# Patient Record
Sex: FEMALE | Race: WHITE | NOT HISPANIC OR LATINO | Employment: OTHER | URBAN - METROPOLITAN AREA
[De-identification: names, ages, dates, MRNs, and addresses within clinical notes are randomized per-mention and may not be internally consistent; named-entity substitution may affect disease eponyms.]

---

## 2017-01-06 ENCOUNTER — TELEPHONE (OUTPATIENT)
Dept: INTERNAL MEDICINE | Facility: CLINIC | Age: 47
End: 2017-01-06

## 2017-01-06 NOTE — TELEPHONE ENCOUNTER
----- Message from Natalia García sent at 1/5/2017 11:42 AM EST -----  Contact: [phone]  PT CALLED TO GET PAP RESULTS

## 2017-01-06 NOTE — TELEPHONE ENCOUNTER
----- Message from WENDY Ignacio sent at 1/6/2017  8:33 AM EST -----  Let patient know Pap was normal repeat in 3 years

## 2017-01-09 ENCOUNTER — APPOINTMENT (OUTPATIENT)
Dept: MAMMOGRAPHY | Facility: HOSPITAL | Age: 47
End: 2017-01-09

## 2017-02-06 ENCOUNTER — HOSPITAL ENCOUNTER (OUTPATIENT)
Dept: MAMMOGRAPHY | Facility: HOSPITAL | Age: 47
Discharge: HOME | End: 2017-02-06

## 2017-02-28 ENCOUNTER — TRANSCRIBE ORDERS (OUTPATIENT)
Dept: INTERNAL MEDICINE | Facility: CLINIC | Age: 47
End: 2017-02-28

## 2017-02-28 ENCOUNTER — HOSPITAL ENCOUNTER (OUTPATIENT)
Dept: MAMMOGRAPHY | Facility: HOSPITAL | Age: 47
Discharge: HOME | End: 2017-02-28
Attending: INTERNAL MEDICINE | Admitting: INTERNAL MEDICINE

## 2017-02-28 DIAGNOSIS — R92.8 ABNORMAL MAMMOGRAM: Primary | ICD-10-CM

## 2017-02-28 DIAGNOSIS — R92.8 ABNORMAL MAMMOGRAM: ICD-10-CM

## 2017-02-28 PROCEDURE — G0206 DX MAMMO INCL CAD UNI: HCPCS

## 2017-02-28 PROCEDURE — 77065 DX MAMMO INCL CAD UNI: CPT | Performed by: RADIOLOGY

## 2017-08-29 ENCOUNTER — APPOINTMENT (OUTPATIENT)
Dept: MAMMOGRAPHY | Facility: HOSPITAL | Age: 47
End: 2017-08-29
Attending: INTERNAL MEDICINE

## 2017-09-14 ENCOUNTER — OFFICE VISIT (OUTPATIENT)
Dept: FAMILY MEDICINE CLINIC | Facility: CLINIC | Age: 47
End: 2017-09-14

## 2017-09-14 VITALS
WEIGHT: 126 LBS | OXYGEN SATURATION: 98 % | HEART RATE: 71 BPM | HEIGHT: 65 IN | SYSTOLIC BLOOD PRESSURE: 118 MMHG | DIASTOLIC BLOOD PRESSURE: 80 MMHG | BODY MASS INDEX: 20.99 KG/M2

## 2017-09-14 DIAGNOSIS — M25.521 RIGHT ELBOW PAIN: Primary | ICD-10-CM

## 2017-09-14 DIAGNOSIS — N95.1 PERIMENOPAUSAL: ICD-10-CM

## 2017-09-14 DIAGNOSIS — M05.721 RHEUMATOID ARTHRITIS INVOLVING RIGHT ELBOW WITH POSITIVE RHEUMATOID FACTOR (HCC): ICD-10-CM

## 2017-09-14 DIAGNOSIS — J45.20 MILD INTERMITTENT ASTHMA WITHOUT COMPLICATION: ICD-10-CM

## 2017-09-14 PROBLEM — M05.9 RHEUMATOID ARTHRITIS WITH POSITIVE RHEUMATOID FACTOR (HCC): Status: ACTIVE | Noted: 2017-09-14

## 2017-09-14 LAB
ALBUMIN SERPL-MCNC: 4.3 G/DL (ref 3.2–4.8)
ALBUMIN/GLOB SERPL: 1.4 G/DL (ref 1.5–2.5)
ALP SERPL-CCNC: 77 U/L (ref 25–100)
ALT SERPL W P-5'-P-CCNC: 32 U/L (ref 7–40)
ANION GAP SERPL CALCULATED.3IONS-SCNC: 10 MMOL/L (ref 3–11)
AST SERPL-CCNC: 34 U/L (ref 0–33)
BASOPHILS # BLD AUTO: 0.02 10*3/MM3 (ref 0–0.2)
BASOPHILS NFR BLD AUTO: 0.4 % (ref 0–1)
BILIRUB SERPL-MCNC: 0.6 MG/DL (ref 0.3–1.2)
BUN BLD-MCNC: 13 MG/DL (ref 9–23)
BUN/CREAT SERPL: 18.6 (ref 7–25)
CALCIUM SPEC-SCNC: 8.9 MG/DL (ref 8.7–10.4)
CHLORIDE SERPL-SCNC: 107 MMOL/L (ref 99–109)
CO2 SERPL-SCNC: 25 MMOL/L (ref 20–31)
CREAT BLD-MCNC: 0.7 MG/DL (ref 0.6–1.3)
CRP SERPL-MCNC: <0.01 MG/DL (ref 0–1)
DEPRECATED RDW RBC AUTO: 39.4 FL (ref 37–54)
EOSINOPHIL # BLD AUTO: 0.17 10*3/MM3 (ref 0–0.3)
EOSINOPHIL NFR BLD AUTO: 3.1 % (ref 0–3)
ERYTHROCYTE [DISTWIDTH] IN BLOOD BY AUTOMATED COUNT: 12.4 % (ref 11.3–14.5)
ERYTHROCYTE [SEDIMENTATION RATE] IN BLOOD: 7 MM/HR (ref 0–20)
GFR SERPL CREATININE-BSD FRML MDRD: 89 ML/MIN/1.73
GLOBULIN UR ELPH-MCNC: 3.1 GM/DL
GLUCOSE BLD-MCNC: 94 MG/DL (ref 70–100)
HCT VFR BLD AUTO: 42.4 % (ref 34.5–44)
HGB BLD-MCNC: 14.6 G/DL (ref 11.5–15.5)
IMM GRANULOCYTES # BLD: 0.01 10*3/MM3 (ref 0–0.03)
IMM GRANULOCYTES NFR BLD: 0.2 % (ref 0–0.6)
LYMPHOCYTES # BLD AUTO: 2.26 10*3/MM3 (ref 0.6–4.8)
LYMPHOCYTES NFR BLD AUTO: 41.2 % (ref 24–44)
MCH RBC QN AUTO: 30 PG (ref 27–31)
MCHC RBC AUTO-ENTMCNC: 34.4 G/DL (ref 32–36)
MCV RBC AUTO: 87.1 FL (ref 80–99)
MONOCYTES # BLD AUTO: 0.45 10*3/MM3 (ref 0–1)
MONOCYTES NFR BLD AUTO: 8.2 % (ref 0–12)
NEUTROPHILS # BLD AUTO: 2.57 10*3/MM3 (ref 1.5–8.3)
NEUTROPHILS NFR BLD AUTO: 46.9 % (ref 41–71)
PLATELET # BLD AUTO: 236 10*3/MM3 (ref 150–450)
PMV BLD AUTO: 10.3 FL (ref 6–12)
POTASSIUM BLD-SCNC: 4.5 MMOL/L (ref 3.5–5.5)
PROT SERPL-MCNC: 7.4 G/DL (ref 5.7–8.2)
RBC # BLD AUTO: 4.87 10*6/MM3 (ref 3.89–5.14)
SODIUM BLD-SCNC: 142 MMOL/L (ref 132–146)
WBC NRBC COR # BLD: 5.48 10*3/MM3 (ref 3.5–10.8)

## 2017-09-14 PROCEDURE — 80053 COMPREHEN METABOLIC PANEL: CPT | Performed by: NURSE PRACTITIONER

## 2017-09-14 PROCEDURE — 85025 COMPLETE CBC W/AUTO DIFF WBC: CPT | Performed by: NURSE PRACTITIONER

## 2017-09-14 PROCEDURE — 86140 C-REACTIVE PROTEIN: CPT | Performed by: NURSE PRACTITIONER

## 2017-09-14 PROCEDURE — 85652 RBC SED RATE AUTOMATED: CPT | Performed by: NURSE PRACTITIONER

## 2017-09-14 PROCEDURE — 36415 COLL VENOUS BLD VENIPUNCTURE: CPT | Performed by: NURSE PRACTITIONER

## 2017-09-14 PROCEDURE — 99214 OFFICE O/P EST MOD 30 MIN: CPT | Performed by: NURSE PRACTITIONER

## 2017-09-14 RX ORDER — IBUPROFEN 200 MG
200 TABLET ORAL EVERY 6 HOURS PRN
COMMUNITY

## 2017-09-14 RX ORDER — NAPROXEN 500 MG/1
500 TABLET ORAL 2 TIMES DAILY WITH MEALS
Qty: 60 TABLET | Refills: 2 | Status: SHIPPED | OUTPATIENT
Start: 2017-09-14 | End: 2017-10-27

## 2017-09-14 NOTE — PROGRESS NOTES
Subjective   Lucero Carrion is a 47 y.o. female.     History of Present Illness She needs to establish care with PCP. Complaining of right elbow pain. Has history of RA. Was on enbrel and methotrexate for 10 years and stopped it due to cost and side effects.  She is a vegetarian and gets plenty of exercise as a . She does not want to establish care with Rheumatology today. She is using Ibuprofen prn for pain. She has also tried using a wrap on her elbow which helps some.  Her mammogram and pap smear are up to date. She has not had an eye exam, but has been to the dentist this year.      The following portions of the patient's history were reviewed and updated as appropriate: allergies, current medications, past family history, past medical history, past social history, past surgical history and problem list.    Review of Systems   Constitutional: Negative for fatigue, fever and unexpected weight change.   HENT: Negative for congestion, hearing loss, nosebleeds, rhinorrhea, sore throat, trouble swallowing and voice change.    Eyes: Negative for pain, discharge, redness and visual disturbance.   Respiratory: Negative for cough, chest tightness, shortness of breath and wheezing.    Cardiovascular: Negative for chest pain, palpitations and leg swelling.   Gastrointestinal: Negative for abdominal distention, abdominal pain, anal bleeding, blood in stool, constipation, diarrhea, nausea and vomiting.   Endocrine: Negative for cold intolerance, heat intolerance, polydipsia, polyphagia and polyuria.   Genitourinary: Negative for dysuria, flank pain, frequency and hematuria.   Musculoskeletal: Positive for arthralgias. Negative for gait problem, joint swelling and myalgias.   Skin: Negative for color change and rash.   Neurological: Negative for dizziness, tremors, seizures, syncope, speech difficulty, weakness, numbness and headaches.   Hematological: Negative.    Psychiatric/Behavioral: Negative.        Objective    Physical Exam   Constitutional: She is oriented to person, place, and time. She appears well-developed and well-nourished. No distress.   HENT:   Head: Normocephalic and atraumatic.   Right Ear: Tympanic membrane and external ear normal.   Left Ear: Tympanic membrane and external ear normal.   Nose: Nose normal.   Mouth/Throat: Oropharynx is clear and moist. No oropharyngeal exudate.   Eyes: Conjunctivae are normal. Pupils are equal, round, and reactive to light. Right eye exhibits no discharge. Left eye exhibits no discharge. No scleral icterus.   Neck: Neck supple. No tracheal deviation present. No thyromegaly present.   Cardiovascular: Normal rate, regular rhythm and normal heart sounds.  Exam reveals no gallop and no friction rub.    No murmur heard.  Pulmonary/Chest: Effort normal and breath sounds normal. No respiratory distress. She has no wheezes.   Abdominal: Soft. Bowel sounds are normal. She exhibits no distension and no mass. There is no tenderness.   Musculoskeletal: She exhibits no edema or deformity.   No edema or erythema of right elbow. FROM. Tender on lateral epicondyle.   Lymphadenopathy:     She has no cervical adenopathy.   Neurological: She is alert and oriented to person, place, and time. Coordination normal.   Skin: Skin is warm and dry. No rash noted. No erythema.   Psychiatric: She has a normal mood and affect. Her speech is normal and behavior is normal. Judgment and thought content normal.   Nursing note and vitals reviewed.      Assessment/Plan   Lucero was seen today for establish care.    Diagnoses and all orders for this visit:    Right elbow pain  -     XR elbow 2 vw right; Future  -     CBC & Differential  -     Comprehensive Metabolic Panel  -     Sedimentation Rate  -     C-reactive Protein  -     naproxen (NAPROSYN) 500 MG tablet; Take 1 tablet by mouth 2 (Two) Times a Day With Meals.    Rheumatoid arthritis involving right elbow with positive rheumatoid  factor    Perimenopausal    Mild intermittent asthma without complication    Will obtain labs and x-ray today. Change ibuprofen to Naprosyn with food. Caution GI upset. Discuss referral to PT for therapy or possible dry needling. If symptoms persist and sed rate is elevated consider steroids or referral to rheumatology.   Discussed the nature of the disease including, risks, complications, implications, management, safe and proper use of medications. Encouraged therapeutic lifestyle changes including low calorie diet with plenty of fruits and vegetables, daily exercise, medication compliance, and keeping scheduled follow up appointments with me and any other providers. Encouraged patient to have appointment for complete physical, fasting labs, appropriate screenings, and immunizations on an annual basis.  Follow up symptoms persist or worsen.

## 2017-09-19 ENCOUNTER — APPOINTMENT (OUTPATIENT)
Dept: MAMMOGRAPHY | Facility: HOSPITAL | Age: 47
End: 2017-09-19
Attending: INTERNAL MEDICINE

## 2017-09-20 ENCOUNTER — HOSPITAL ENCOUNTER (OUTPATIENT)
Dept: GENERAL RADIOLOGY | Facility: HOSPITAL | Age: 47
Discharge: HOME | End: 2017-09-20

## 2017-09-20 ENCOUNTER — HOSPITAL ENCOUNTER (OUTPATIENT)
Dept: MAMMOGRAPHY | Facility: HOSPITAL | Age: 47
Discharge: HOME | End: 2017-09-20
Attending: INTERNAL MEDICINE | Admitting: INTERNAL MEDICINE

## 2017-09-20 DIAGNOSIS — R92.8 ABNORMAL MAMMOGRAM: ICD-10-CM

## 2017-09-20 DIAGNOSIS — M25.521 RIGHT ELBOW PAIN: ICD-10-CM

## 2017-09-20 PROCEDURE — 73070 X-RAY EXAM OF ELBOW: CPT

## 2017-09-20 PROCEDURE — G0206 DX MAMMO INCL CAD UNI: HCPCS

## 2017-09-20 PROCEDURE — 77065 DX MAMMO INCL CAD UNI: CPT | Performed by: RADIOLOGY

## 2017-09-26 ENCOUNTER — TELEPHONE (OUTPATIENT)
Dept: FAMILY MEDICINE CLINIC | Facility: CLINIC | Age: 47
End: 2017-09-26

## 2017-09-26 DIAGNOSIS — M25.521 ELBOW PAIN, RIGHT: Primary | ICD-10-CM

## 2017-09-26 NOTE — TELEPHONE ENCOUNTER
----- Message from Edel Banda MA sent at 9/26/2017  7:45 AM EDT -----  Contact: [phone]      ----- Message -----     From: Aminah Godinez     Sent: 9/25/2017  10:53 AM       To: Edel Banda MA    PATIENT SAYS HER ELBOW IS STILL VERY PAINFUL AND SHE NEEDS TO BE REFERRED ONTO A SPECIALIST SINCE THE XRAY WAS NORMAL

## 2017-09-27 ENCOUNTER — TELEPHONE (OUTPATIENT)
Dept: FAMILY MEDICINE CLINIC | Facility: CLINIC | Age: 47
End: 2017-09-27

## 2017-09-27 NOTE — TELEPHONE ENCOUNTER
Spoke with patient and she is requesting the compound creme regardless of cost. Will notify provider to send in.  ----- Message from Arpita Hargrove DNP, WENDY sent at 9/26/2017  3:17 PM EDT -----  Contact: [phone]  We gave her naprosyn.  There is a cream of this but it has to be compounded and insurance doesn't pay for it  ----- Message -----     From: Edel Banda MA     Sent: 9/26/2017  12:41 PM       To: Arpita Hargrove DNP, WENDY        ----- Message -----     From: Hanna Britt     Sent: 9/26/2017  12:37 PM       To: Edel Banda MA    Pt would like to know if there is an anti-inflammatory medications/creme that she can use unti her appt [facility] ortho  Pt states she did get an appt with ortho but it is a couple of weeks away    Pharm# [facility] [address]

## 2017-09-28 ENCOUNTER — TELEPHONE (OUTPATIENT)
Dept: FAMILY MEDICINE CLINIC | Facility: CLINIC | Age: 47
End: 2017-09-28

## 2017-09-28 NOTE — TELEPHONE ENCOUNTER
Patient request compounded med for elbow pain. Rx called in to [facility] for ketoprofen and lidocaine tid.  They will contact patient.

## 2017-10-04 ENCOUNTER — OFFICE VISIT (OUTPATIENT)
Dept: ORTHOPEDIC SURGERY | Facility: CLINIC | Age: 47
End: 2017-10-04

## 2017-10-04 VITALS
SYSTOLIC BLOOD PRESSURE: 156 MMHG | HEART RATE: 58 BPM | DIASTOLIC BLOOD PRESSURE: 85 MMHG | WEIGHT: 126.2 LBS | BODY MASS INDEX: 21.02 KG/M2 | HEIGHT: 65 IN

## 2017-10-04 DIAGNOSIS — M77.8 TENDINITIS OF ELBOW: Primary | ICD-10-CM

## 2017-10-04 PROCEDURE — 20551 NJX 1 TENDON ORIGIN/INSJ: CPT | Performed by: ORTHOPAEDIC SURGERY

## 2017-10-04 PROCEDURE — 99204 OFFICE O/P NEW MOD 45 MIN: CPT | Performed by: ORTHOPAEDIC SURGERY

## 2017-10-04 RX ORDER — BETAMETHASONE SODIUM PHOSPHATE AND BETAMETHASONE ACETATE 3; 3 MG/ML; MG/ML
3 INJECTION, SUSPENSION INTRA-ARTICULAR; INTRALESIONAL; INTRAMUSCULAR; SOFT TISSUE
Status: COMPLETED | OUTPATIENT
Start: 2017-10-04 | End: 2017-10-04

## 2017-10-04 RX ORDER — LIDOCAINE HYDROCHLORIDE 10 MG/ML
1 INJECTION, SOLUTION INFILTRATION; PERINEURAL
Status: COMPLETED | OUTPATIENT
Start: 2017-10-04 | End: 2017-10-04

## 2017-10-04 RX ORDER — BUPIVACAINE HYDROCHLORIDE 2.5 MG/ML
1 INJECTION, SOLUTION INFILTRATION; PERINEURAL
Status: COMPLETED | OUTPATIENT
Start: 2017-10-04 | End: 2017-10-04

## 2017-10-04 RX ADMIN — BUPIVACAINE HYDROCHLORIDE 1 ML: 2.5 INJECTION, SOLUTION INFILTRATION; PERINEURAL at 14:12

## 2017-10-04 RX ADMIN — BETAMETHASONE SODIUM PHOSPHATE AND BETAMETHASONE ACETATE 3 MG: 3; 3 INJECTION, SUSPENSION INTRA-ARTICULAR; INTRALESIONAL; INTRAMUSCULAR; SOFT TISSUE at 14:12

## 2017-10-04 RX ADMIN — LIDOCAINE HYDROCHLORIDE 1 ML: 10 INJECTION, SOLUTION INFILTRATION; PERINEURAL at 14:12

## 2017-10-04 NOTE — PROGRESS NOTES
Procedure   Lateral epicondyle elbow  Date/Time: 10/4/2017 2:12 PM  Consent given by: patient  Site marked: site marked  Timeout: Immediately prior to procedure a time out was called to verify the correct patient, procedure, equipment, support staff and site/side marked as required   Supporting Documentation  Indications: pain   Procedure Details  Location: elbow - R elbow  Preparation: Patient was prepped and draped in the usual sterile fashion  Needle size: 22 G  Approach: anteromedial  Medications administered: 1 mL bupivacaine; 1 mL lidocaine 1 %; 3 mg betamethasone acetate-betamethasone sodium phosphate 6 (3-3) MG/ML  Patient tolerance: patient tolerated the procedure well with no immediate complications

## 2017-10-27 ENCOUNTER — OFFICE VISIT (OUTPATIENT)
Dept: INTERNAL MEDICINE | Facility: CLINIC | Age: 47
End: 2017-10-27

## 2017-10-27 VITALS
WEIGHT: 123 LBS | BODY MASS INDEX: 20.47 KG/M2 | SYSTOLIC BLOOD PRESSURE: 122 MMHG | TEMPERATURE: 97.8 F | DIASTOLIC BLOOD PRESSURE: 82 MMHG | HEART RATE: 64 BPM

## 2017-10-27 DIAGNOSIS — N95.1 PERIMENOPAUSAL: Primary | ICD-10-CM

## 2017-10-27 PROCEDURE — 99213 OFFICE O/P EST LOW 20 MIN: CPT | Performed by: NURSE PRACTITIONER

## 2017-10-27 RX ORDER — FLUOXETINE HYDROCHLORIDE 20 MG/1
20 CAPSULE ORAL DAILY
Qty: 30 CAPSULE | Refills: 11 | Status: SHIPPED | OUTPATIENT
Start: 2017-10-27 | End: 2018-02-26 | Stop reason: SINTOL

## 2017-11-06 ENCOUNTER — TELEPHONE (OUTPATIENT)
Dept: INTERNAL MEDICINE | Facility: CLINIC | Age: 47
End: 2017-11-06

## 2017-11-06 DIAGNOSIS — N95.1 PERI-MENOPAUSAL: Primary | ICD-10-CM

## 2017-11-07 ENCOUNTER — TELEPHONE (OUTPATIENT)
Dept: INTERNAL MEDICINE | Facility: CLINIC | Age: 47
End: 2017-11-07

## 2017-11-22 ENCOUNTER — LAB (OUTPATIENT)
Dept: LAB | Facility: HOSPITAL | Age: 47
End: 2017-11-22

## 2017-11-22 ENCOUNTER — OFFICE VISIT (OUTPATIENT)
Dept: OBSTETRICS AND GYNECOLOGY | Facility: CLINIC | Age: 47
End: 2017-11-22

## 2017-11-22 VITALS
DIASTOLIC BLOOD PRESSURE: 90 MMHG | BODY MASS INDEX: 21.51 KG/M2 | HEART RATE: 62 BPM | WEIGHT: 126 LBS | SYSTOLIC BLOOD PRESSURE: 130 MMHG | RESPIRATION RATE: 14 BRPM | HEIGHT: 64 IN | OXYGEN SATURATION: 99 %

## 2017-11-22 DIAGNOSIS — N95.1 HOT FLASHES, MENOPAUSAL: Primary | ICD-10-CM

## 2017-11-22 DIAGNOSIS — N95.1 HOT FLASHES, MENOPAUSAL: ICD-10-CM

## 2017-11-22 DIAGNOSIS — Z79.890 HORMONE REPLACEMENT THERAPY (HRT): ICD-10-CM

## 2017-11-22 LAB
ESTRADIOL SERPL HS-MCNC: 103 PG/ML
FSH SERPL-ACNC: 73.6 MIU/ML
TSH SERPL DL<=0.05 MIU/L-ACNC: 0.97 MIU/ML (ref 0.35–5.35)

## 2017-11-22 PROCEDURE — 36415 COLL VENOUS BLD VENIPUNCTURE: CPT

## 2017-11-22 PROCEDURE — 82670 ASSAY OF TOTAL ESTRADIOL: CPT

## 2017-11-22 PROCEDURE — 99213 OFFICE O/P EST LOW 20 MIN: CPT | Performed by: NURSE PRACTITIONER

## 2017-11-22 PROCEDURE — 84443 ASSAY THYROID STIM HORMONE: CPT

## 2017-11-22 PROCEDURE — 83001 ASSAY OF GONADOTROPIN (FSH): CPT

## 2017-11-22 RX ORDER — ESTRADIOL 0.05 MG/D
1 PATCH TRANSDERMAL WEEKLY
Qty: 8 PATCH | Refills: 12 | Status: SHIPPED | OUTPATIENT
Start: 2017-11-22 | End: 2017-12-04

## 2017-11-22 RX ORDER — MEDROXYPROGESTERONE ACETATE 2.5 MG/1
2.5 TABLET ORAL DAILY
Qty: 30 TABLET | Refills: 12 | Status: SHIPPED | OUTPATIENT
Start: 2017-11-22 | End: 2017-12-04

## 2017-11-22 NOTE — PROGRESS NOTES
[facility] NEW PAT[facility]      Lucero Carrion  [id number]  1970      Chief Complaint: Establish Care (Hot flashes, night sweats, sleeplessness and mood swings)        History of present illness:  Lucero Carrion is a 47 y.o. year old female, new to the practice, who is here today for complaint of hot flashes, night sweats, sleeplessness, and mood swings.  Her nighttime symptoms have been off and on ×1.5 years that are progressively worsening.  She states the hot flashes and mood swings have been present since about 2017.  She was seen in her PCP office for this same concern on 10/27/17 and started on Prozac for vasomotor symptom management.  She states she took this less than 1 week due to feeling overly sedated and concerns regarding side effects.  She has several physician friends who encouraged her to speak with GYN regarding hormone replacement therapy.  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  She is sexually active and condoms are typically used.  Her mammogram and Pap smear currently up-to-date.  Her last routine annual well woman exam was in 2016.  Last breast abnormalities or abnormal Pap smears.  She states her periods were very regular up until 1 year ago.  She states she has had no true menstrual bleeding in 1 year, only saw spotting once or twice.  She states she feels she is becoming menopausal and is interested in starting HRT.    Obstetric History:  OB History      Para Term  AB Living    0 0 0 0 0 0    SAB TAB Ectopic Multiple Live Births    0 0 0 0 0           Menstrual History:     No LMP recorded (lmp unknown).          Past Medical History:   Diagnosis Date   • Arthritis     RHEUMATOID   • Asthma        Past Surgical History:   Procedure Laterality Date   • FINGER FRACTURE SURGERY Left        MEDICATIONS: The current medication list was reviewed and reconciled.     Allergies:  has No Known Allergies.    Family History  "  Problem Relation Age of Onset   • Osteoporosis Mother    • Asthma Father    • Breast cancer Neg Hx    • Ovarian cancer Neg Hx    • BRCA 1/2 Neg Hx        Health Maintenance:  Last mammogram was 9/20/2017. Last pap smear was 12/2016, results were  normal PAP.. She  does not have a history of abnormal pap smears.      Review of Systems   Constitutional: Negative for fatigue, fever and unexpected weight change.   HENT: Negative for congestion, ear pain, hearing loss, sinus pressure and trouble swallowing.    Eyes: Negative for visual disturbance.   Respiratory: Negative for cough, chest tightness, shortness of breath and wheezing.    Cardiovascular: Negative for chest pain, palpitations and leg swelling.   Gastrointestinal: Negative for abdominal distention, abdominal pain, constipation, diarrhea, nausea and vomiting.   Endocrine: Positive for heat intolerance (hot flashes, night sweats). Negative for cold intolerance, polydipsia, polyphagia and polyuria.   Genitourinary: Negative for difficulty urinating, dyspareunia, dysuria, frequency, hematuria, pelvic pain, urgency, vaginal bleeding, vaginal discharge and vaginal pain.   Musculoskeletal: Negative for arthralgias, gait problem, joint swelling and myalgias.   Skin: Negative for color change, pallor and rash.   Neurological: Negative for dizziness, seizures, syncope, weakness, light-headedness, numbness and headaches.   Hematological: Negative for adenopathy. Does not bruise/bleed easily.   Psychiatric/Behavioral: Positive for agitation, dysphoric mood and sleep disturbance (r/t night sweats). Negative for confusion and suicidal ideas. The patient is not nervous/anxious.        Physical Exam  Vital Signs: /90  Pulse 62  Resp 14  Ht 63.5\" (161.3 cm)  Wt 126 lb (57.2 kg)  LMP  (LMP Unknown) Comment: LMP: Nov 2016  SpO2 99%  BMI 21.97 kg/m2   General Appearance:  alert, cooperative, no apparent distress, appears stated age and normal weight "   Neurologic/Psychiatric: A&O x 3, gait steady, appropriate affect   HEENT:  Normocephalic, without obvious abnormality, mucous membranes moist   Lungs:   Clear to auscultation bilaterally; respirations regular, even, and unlabored bilaterally   Heart:  Regular rate and rhythm, no murmurs appreciated   Breasts:  deferred   Abdomen:   Soft, non-tender, non-distended and no organomegaly   Extremities: Normal, atraumatic; no clubbing, cyanosis, or edema    Skin: No rashes, ulcers, or suspicious lesions noted   Pelvic: deferred       Procedure Note:  No notes on file    Assessment and Plan:    Lucero was seen today for establish care.    Diagnoses and all orders for this visit:    Hot flashes, menopausal  -     Follicle Stimulating Hormone; Future  -     Estradiol; Future  -     TSH; Future    Hormone replacement therapy (HRT)    Other orders  -     estradiol (CLIMARA) 0.05 MG/24HR patch; Place 1 patch on the skin 1 (One) Time Per Week.  -     medroxyPROGESTERone (PROVERA) 2.5 MG tablet; Take 1 tablet by mouth Daily.        Lucero and I discussed her symptoms and amenorrhea over the last 1 year.  This is very consistent with menopausal status.  Decision was made to obtain FSH, E3, and TSH for confirmation and to rule out any thyroid abnormalities.  She will be notified of all results upon their return.  She was informed that I suspect FSH to be high, estradiol to be low, and TSH to be normal.  Any abnormalities will be treated accordingly.    She is interested in pursuing hormone replacement therapy at this time.  We discussed a variety of options.  Most importantly, as she still has a uterus she will need combined hormone replacement therapy with estrogen and a progestin.  Reviewed HRT risks, benefits, and potential side effects.  She is an active, healthy weight, nonsmoker.  Her mammogram is currently up-to-date.   At the conclusion of our discussion we agreed on beginning therapy with estradiol patches 0.05 mg and  daily Provera 2.5 mg PO.  She is understanding that she may not as her patches without taking her oral progestin.  She will be due for her annual exam around the beginning of the year.  We agreed on plan for her annual in February and can follow up on her HRT at that time.  She is encouraged to call with any questions or concerns for that time.    Return in about 3 months (around 2/22/2018) for annual exam or PRN.      Tesha Thronton, WENDY      Note: Speech recognition transcription software was used to dictate portions of this document.  An attempt at proofreading has been made though minor errors in transcription may still be present.  Please do not hesitate to call our office with any questions.

## 2017-11-27 ENCOUNTER — OFFICE VISIT (OUTPATIENT)
Dept: ORTHOPEDIC SURGERY | Facility: CLINIC | Age: 47
End: 2017-11-27

## 2017-11-27 VITALS
BODY MASS INDEX: 20.16 KG/M2 | WEIGHT: 121 LBS | DIASTOLIC BLOOD PRESSURE: 87 MMHG | HEART RATE: 67 BPM | SYSTOLIC BLOOD PRESSURE: 129 MMHG | HEIGHT: 65 IN

## 2017-11-27 DIAGNOSIS — M77.8 TENDINITIS OF ELBOW: Primary | ICD-10-CM

## 2017-11-27 PROCEDURE — 99213 OFFICE O/P EST LOW 20 MIN: CPT | Performed by: ORTHOPAEDIC SURGERY

## 2017-11-27 NOTE — PROGRESS NOTES
[facility] Note    Subjective     Chief Complaint   Patient presents with   • Right Elbow - Follow-up     3 week follow up: Right elbow tendinitis (Injection last visit on 10/4/2017)        MAUREEN Carrion is a 47 y.o. female. She got little to no relief with the injection.  Her pain is on the medial and lateral aspect of the elbow.  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  It is better with ibuprofen.  Pain is 6 out of 10 is aching burning and stabbing she's had it for over a year.        Past Medical History:   Diagnosis Date   • Arthritis     RHEUMATOID   • Asthma       Past Surgical History:   Procedure Laterality Date   • FINGER FRACTURE SURGERY Left       Family History   Problem Relation Age of Onset   • Osteoporosis Mother    • Asthma Father    • Breast cancer Neg Hx    • Ovarian cancer Neg Hx    • BRCA 1/2 Neg Hx      Social History     Social History   • Marital status:      Spouse name: N/A   • Number of children: N/A   • Years of education: N/A     Occupational History   •       Social History Main Topics   • Smoking status: Former Smoker     Packs/day: 0.50     Years: 10.00     Types: Cigarettes     Quit date: 2009   • Smokeless tobacco: Never Used   • Alcohol use Yes      Comment: 1 X WEEKLY   • Drug use: No   • Sexual activity: Defer     Other Topics Concern   • Not on file     Social History Narrative      Current Outpatient Prescriptions on File Prior to Visit   Medication Sig Dispense Refill   • albuterol (PROVENTIL HFA;VENTOLIN HFA) 108 (90 BASE) MCG/ACT inhaler Inhale 2 puffs As Needed for wheezing.     • estradiol (CLIMARA) 0.05 MG/24HR patch Place 1 patch on the skin 1 (One) Time Per Week. 8 patch 12   • FLUoxetine (PROzac) 20 MG capsule Take 1 capsule by mouth Daily. 30 capsule 11   • ibuprofen (ADVIL,MOTRIN) 200 MG tablet Take 200 mg by mouth Every 6 (Six) Hours As Needed for Mild Pain .     • magnesium chloride ER (MAG-DELAY) 535 (64  "Mg) MG CR tablet Take 64 mg by mouth Daily.     • medroxyPROGESTERone (PROVERA) 2.5 MG tablet Take 1 tablet by mouth Daily. 30 tablet 12   • Omega-3 Fatty Acids (OMEGA 3 PO) Take  by mouth.       No current facility-administered medications on file prior to visit.       No Known Allergies     The following portions of the patient's history were reviewed and updated as appropriate: allergies, current medications, past family history, past medical history, past social history, past surgical history and problem list.    Review of Systems   Constitutional: Negative.    HENT: Negative.    Eyes: Negative.    Respiratory: Positive for wheezing.    Cardiovascular: Negative.    Gastrointestinal: Negative.    Endocrine: Negative.    Genitourinary: Negative.    Musculoskeletal: Positive for arthralgias.   Skin: Negative.    Allergic/Immunologic: Negative.    Neurological: Negative.    Hematological: Negative.    Psychiatric/Behavioral: Negative.         Objective      Physical Exam  /87  Pulse 67  Ht 64.5\" (163.8 cm)  Wt 121 lb (54.9 kg)  LMP  (LMP Unknown) Comment: LMP: Nov 2016  BMI 20.45 kg/m2    Body mass index is 20.45 kg/(m^2).        GENERAL APPEARANCE: awake, alert & oriented x 3, in no acute distress and well developed, well nourished  PSYCH: normal mood andaffect  LUNGS:  breathing nonlabored, no wheezing  EYES: sclera anicteric, pupils equal  CARDIOVASCULAR: palpable pulses dorsalis pedis, palpable posterior tibial bilaterally. Capillary refill less than 2 seconds  INTEGUMENTARY: skin intact, no clubbing, cyanosis  NEUROLOGIC:  Normal gait and balance            Ortho Exam  Peripheral Vascular   Left Upper Extremity    No cyanotic nail beds    Pink nail beds and rapid capillary refill   Palpation    Radial Pulse - Bilaterally normal    Neurologic   Sensory - Elbow   Inspection and Palpation:    Light touch: intact - right hand   Muscle Strength and Tone:    Left bicep - 5/5    Left tricep - 5/5    Left " wrist extensors - 5/5     Left wrist flexors - 5/5    Left intrinsics - 5/5    Musculoskeletal   Left Upper Extremity - Elbow   Inspection and Palpation     Tenderness - medial and lateral epicondyles    Effusion - none    Crepitus - none   Range of Motion    Flexion: AROM - 145 degrees    Extension - AROM - 0 degrees     Forearm supination: AROM - 90 degrees    Forearm pronation - AROM - 90 degrees   Instability    Left - tennis elbow test negative   Deformities/Malalignments/Discepancies - non   Functional testing:    Tinel's sign negative    Valgus stress test negative    Varus stress test negative    Imaging/Studies  Imaging Results (last 24 hours)     ** No results found for the last 24 hours. **          Assessment/Plan      Lucero was seen today for follow-up.    Diagnoses and all orders for this visit:    Tendinitis of elbow  -     MRI Elbow Right Without Contrast; Future      I will see her back after the MRI    Medical Decision Making  Management Options : over-the-counter medicine  Data/Risk: radiology tests and independent visualization of imaging, lab tests, or EMG/NCV    Freddie Reyes MD  11/27/17  4:14 PM

## 2017-12-04 ENCOUNTER — HOSPITAL ENCOUNTER (OUTPATIENT)
Dept: MRI IMAGING | Facility: HOSPITAL | Age: 47
Discharge: HOME | End: 2017-12-04
Attending: ORTHOPAEDIC SURGERY | Admitting: ORTHOPAEDIC SURGERY

## 2017-12-04 ENCOUNTER — PATIENT MESSAGE (OUTPATIENT)
Dept: OBSTETRICS AND GYNECOLOGY | Facility: CLINIC | Age: 47
End: 2017-12-04

## 2017-12-04 DIAGNOSIS — M77.8 TENDINITIS OF ELBOW: ICD-10-CM

## 2017-12-04 PROCEDURE — 73221 MRI JOINT UPR EXTREM W/O DYE: CPT

## 2017-12-04 RX ORDER — ESTRADIOL 0.05 MG/D
1 FILM, EXTENDED RELEASE TRANSDERMAL 2 TIMES WEEKLY
Qty: 8 PATCH | Refills: 12 | Status: SHIPPED | OUTPATIENT
Start: 2017-12-04 | End: 2018-12-14 | Stop reason: SDUPTHER

## 2017-12-04 NOTE — TELEPHONE ENCOUNTER
From: Lucero Carrion  To: WENDY Carrizales  Sent: 12/4/2017 9:54 AM EST  Subject: Prescription Question    Imelda Parkinson,   Thank you for taking time with me and answering my myriad of questions!  I have 2 concerns about the HRT I am taking.   Firstly, I do feel better!  I would really prefer a bi-weekly patch of estradiol. 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   also   I am very concerned about taking synthetic HRT. The MPA (as you know) is synthetic progesterone.  Can you please prescribe the generic pill for Prometrium?    please let me know if these changes are possible  and thank you again!

## 2017-12-08 ENCOUNTER — TELEPHONE (OUTPATIENT)
Dept: INTERNAL MEDICINE | Facility: CLINIC | Age: 47
End: 2017-12-08

## 2017-12-12 DIAGNOSIS — Z00.00 ANNUAL PHYSICAL EXAM: Primary | ICD-10-CM

## 2017-12-13 ENCOUNTER — OFFICE VISIT (OUTPATIENT)
Dept: ORTHOPEDIC SURGERY | Facility: CLINIC | Age: 47
End: 2017-12-13

## 2017-12-13 VITALS
BODY MASS INDEX: 21.08 KG/M2 | HEIGHT: 64 IN | DIASTOLIC BLOOD PRESSURE: 66 MMHG | WEIGHT: 123.46 LBS | HEART RATE: 113 BPM | SYSTOLIC BLOOD PRESSURE: 94 MMHG

## 2017-12-13 DIAGNOSIS — M77.8 TENDINITIS OF ELBOW: Primary | ICD-10-CM

## 2017-12-13 PROCEDURE — 99213 OFFICE O/P EST LOW 20 MIN: CPT | Performed by: ORTHOPAEDIC SURGERY

## 2017-12-18 ENCOUNTER — HOSPITAL ENCOUNTER (OUTPATIENT)
Dept: PHYSICAL THERAPY | Facility: HOSPITAL | Age: 47
Setting detail: THERAPIES SERIES
Discharge: HOME | End: 2017-12-18

## 2017-12-18 DIAGNOSIS — M77.11 RIGHT LATERAL EPICONDYLITIS: Primary | ICD-10-CM

## 2017-12-18 DIAGNOSIS — G89.29 CHRONIC ELBOW PAIN, RIGHT: ICD-10-CM

## 2017-12-18 DIAGNOSIS — M25.521 CHRONIC ELBOW PAIN, RIGHT: ICD-10-CM

## 2017-12-18 PROCEDURE — 97161 PT EVAL LOW COMPLEX 20 MIN: CPT | Performed by: PHYSICAL THERAPIST

## 2017-12-18 NOTE — THERAPY EVALUATION
Outpatient Physical Therapy Ortho Initial Evaluation  [facility]     Patient Name: Lucero Carrion  : 1970  MRN: [id number]  Today's Date: 2017      Visit Date: 2017    Patient Active Problem List   Diagnosis   • Asthma   • Arthritis   • Rheumatoid arthritis with positive rheumatoid factor   • Perimenopausal        Past Medical History:   Diagnosis Date   • Arthritis     RHEUMATOID   • Asthma         Past Surgical History:   Procedure Laterality Date   • FINGER FRACTURE SURGERY Left        Visit Dx:     ICD-10-CM ICD-9-CM   1. Right lateral epicondylitis M77.11 726.32   2. Chronic elbow pain, right M25.521 719.42    G89.29 338.29             Patient History       17 1300          History    Chief Complaint Pain  -CP      Type of Pain Elbow pain  -CP      Date Current Problem(s) Began 16  -CP      Brief Description of Current Complaint Pt states ongoing right elbow pain for the past year. She states her pain increases with repetitive motion but also hurts at rest or with pressure to lateral epicondyle. She had Xray and MRI of right elbow, both negative for acute pathology per pt report. She states Dr. Reyes noted some arthritis in R elbow. Pt had injection to elbow in Oct, minimal pain relief noted afterward. She states she is limited with participation in yoga, work tasks with horses, and lifting at home/work.     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 She likes yoga, but is limited secondary to pain.   -CP      Patient seeing anyone else for  problem(s)? Yes  -CP      How has patient tried to help current problem? injection, NSAIDs, ice/heat, topical  -CP      What clinical tests have you had for this problem? X-ray;MRI  -CP      Results of Clinical Tests Xray-No acute osseous abnormality. MRI-mild arthritic change on articular surface of the capitulum as it articulates w/ radial head.   -CP      History of Previous Related Injuries --   denies previous injury; ongoing for 1 year  -CP      Are you or can you be pregnant No  -CP      Pain     Pain Location Elbow  -CP      Pain at Present 3  -CP      Pain at Best 0  -CP      Pain at Worst 8  -CP      Pain Description Aching;Dull;Sharp;Shooting;Tightness;Tingling  -CP      What Performance Factors Make the Current Problem(s) WORSE? repetitive motion into pronation, CKC positions with wt bearing through RUE, gripping ex, touch along lateral elbow  -CP      Pain Comments Pt states pain comes and goes throughout the day/night. She verbalized n/t in bilat wrists/hands, alfredo at night. Pain isolated to R elbow, both medial and lateral epicondyles.   -CP      Is your sleep disturbed? No  -CP      Is medication used to assist with sleep? No  -CP      Difficulties at work? carrying objects, lifting overhead, active pronation, etc.   -CP      Difficulties with recreational activities? pain with yoga in modified positions  -CP      Fall Risk Assessment    Any falls in the past year: No  -CP      Daily Activities    Primary Language English  -CP      Pt Participated in POC and Goals Yes  -CP      Safety    Are you being hurt, hit, or frightened by anyone at home or in your life? No  -CP        User Key  (r) = Recorded By, (t) = Taken By, (c) = Cosigned By    Initials Name Provider Type    CP Corinne E Perkins, PT Physical Therapist                PT Ortho       12/18/17 1300    Subjective Comments    Subjective Comments Pt presents with c/o right elbow pain.   -CP    Subjective Pain    Able to rate subjective pain? yes   -CP    Pre-Treatment Pain Level 3  -CP    Post-Treatment Pain Level 3  -CP    Posture/Observations    Posture/Observations Comments Increased carry angle; more pronounced R medial condyle, decreased soft tissue. No atrophy in hand noted.   -CP    Special Tests/Palpation    Special Tests/Palpation Elbow/Forearm  -CP    Elbow/Forearm Palpation    Lateral Epicondyle Right:;Tender  -CP    Medial Epicondyle Right:;Tender  -CP    Radial Head Right:;Tender   with movement; not at rest  -CP    Olecranon --   not tender  -CP    Pronator Teres Right:;Guarded/taut  -CP    Elbow/Forearm Palpation? Yes  -CP    Elbow/Forearm Special Tests    Valgus Stress at 30 Degrees (UCL Lesion) Right:;Negative  -CP    Varus Stress at 30 Degrees (RCL Lesion) Right:;Negative  -CP    Moving Valgus Stress Test,  Deg (UCL Lesion) Negative;Right:  -CP    Lateral Epicondyle Test (Tennis Elbow) Right:;Positive  -CP    Tinel's Sign (Ulnar Nerve Irritation) Right:;Negative   reports intermittent sx in distribution  -CP    Pronator Teres Syndrome Test (Median Nerve Entrapment) Right:;Negative  -CP    Active Pronation Right:;Positive  -CP    ROM (Range of Motion)    General ROM no range of motion deficits identified  -CP    General UE Assessment    ROM elbow/forearm, left: UE ROM deficit;elbow/forearm, right: UE ROM deficit  -CP    ROM Detail BUE AROM WFL, no deficits noted; decreased IR on R; however still functional.   -CP    Left Elbow/Forearm    Extension/Flexion AROM Deficit WFL; 0-145  -CP    Supination AROM Deficit WFL  -CP    Pronation AROM Deficit WFL  -CP    Right Elbow/Forearm    Extension/Flexion AROM Deficit WFL  -CP    Supination AROM Deficit WFL  -CP    Pronation AROM Deficit 75%   pain end range  -CP    MMT (Manual Muscle Testing)    General MMT Assessment Detail decreased pronation/supination strength, decrease R elbow ext 4/5.   -CP      User Key  (r) = Recorded By, (t) = Taken By, (c) = Cosigned By    Initials Name Provider Type     CP Corinne E Perkins, PT Physical Therapist                                  PT OP Goals       12/18/17 1300       PT Short Term Goals    STG Date to Achieve 01/01/18  -CP     STG 1 Patient will demonstrate independence with home exercise program.  -CP     STG 1 Progress New  -CP     STG 2 Patient will report pain 1-2 of 10 at rest on affected R side.  -CP     STG 2 Progress New  -CP     STG 3 Patient will open jar, write, prepare meal, carry/lift object up to 10 # without complaint of pain in right elbow.   -CP     STG 3 Progress New  -CP     Long Term Goals    LTG Date to Achieve 01/17/18  -CP     LTG 1 Patient will report overall Quick Dash improvements > 15 points to demonstrate increased independence with daily tasks.  -CP     LTG 1 Progress New  -CP     LTG 2 Patient will manage simulated work activities with pain less than 1/10 on R elbow.  -CP     LTG 2 Progress New  -CP     LTG 3 Pt. will be independent and compliant with advanced home exercise program to facilitate self-management of symptoms.  -CP     LTG 3 Progress New  -CP     LTG 4 Pt will return to modified home yoga techniques 3x/week without reproduction of R elbow symptoms.   -CP     LTG 4 Progress New  -CP     Time Calculation    PT Goal Re-Cert Due Date 03/18/18  -CP       User Key  (r) = Recorded By, (t) = Taken By, (c) = Cosigned By    Initials Name Provider Type    CP Corinne E Perkins, PT Physical Therapist                PT Assessment/Plan       12/18/17 1300       PT Assessment    Functional Limitations Performance in leisure activities;Performance in work activities;Limitation in home management  -CP     Impairments Sensation;Poor body mechanics;Pain;Muscle strength  -CP     Assessment Comments Pt is a 48 yo female referred to PT for right elbow tendinitis who demonstrated signs and symptoms consistent with lateral epicondylitis along with secondary arthritis. Pt has h/o RA; however denies previous elbow injuries. Pt is limited with  activity tolerance at work secondary to pain, demonstrated decreased UE strength, and verbalized decreased participation in functional hobbies, like yoga due to pain in elbow. She would benefit from skilled PT to address above stated deficits, improve quality of life, and return to PLOF.   -CP     Please refer to paper survey for additional self-reported information Yes  -CP     Rehab Potential Good  -CP     Patient/caregiver participated in establishment of treatment plan and goals Yes  -CP     Patient would benefit from skilled therapy intervention Yes  -CP     PT Plan    PT Frequency 1x/week;2x/week  -CP     Predicted Duration of Therapy Intervention (days/wks) 8-10 visits  -CP     Planned CPT's? PT EVAL LOW COMPLEXITY: 39768;PT RE-EVAL: 31641;PT THER PROC EA 15 MIN: 11808;PT MANUAL THERAPY EA 15 MIN: 35997;PT ELECTRICAL STIM UNATTEND: ;PT ULTRASOUND EA 15 MIN: 80535;PT HOT/COLD PACK WC NONMCARE: 42572;PT IONTOPHORESIS EA 15 MIN: 65323  -CP     PT Plan Comments Assess compliance with medial nerve glides and CTS stretch. Trial scapular stabilization ther ex with wrist in neutral position along with eccentric ex for pronation as tolerated. Trial DN next visit along lateral epicondyle, pending symptoms.   -CP       User Key  (r) = Recorded By, (t) = Taken By, (c) = Cosigned By    Initials Name Provider Type    CP Corinne E Perkins, PT Physical Therapist                  Exercises       12/18/17 1300          Subjective Comments    Subjective Comments Pt presents with c/o right elbow pain.   -CP      Subjective Pain    Able to rate subjective pain? yes  -CP      Pre-Treatment Pain Level 3  -CP      Post-Treatment Pain Level 3  -CP        User Key  (r) = Recorded By, (t) = Taken By, (c) = Cosigned By    Initials Name Provider Type    CP Corinne E Perkins, PT Physical Therapist                        Outcome Measure Options: Quick DASH  Quick DASH  Open a tight or new jar.: Moderate Difficulty  Do heavy household  chores (e.g., wash walls, wash floors): Moderate Difficulty  Carry a shopping bag or briefcase: Moderate Difficulty  Wash your back: No Difficulty  Use a knife to cut food: No Difficulty  Recreational activities in which you take some force or impact through your arm, should or hand (e.g. golf, hammering, tennis, etc.): Moderate Difficulty  During the past week, to what extent has your arm, shoulder, or hand problem interfered with your normal social activites with family, friends, neighbors or groups?: Quite a bit  During the past week, were you limited in your work or other regular daily activities as a result of your arm, shoulder or hand problem?: Moderately Limited  Arm, Shoulder, or hand pain: Moderate  Tingling (pins and needles) in your arm, shoulder, or hand: Severe  During the past week, how much difficulty have you had sleeping because of the pain in your arm, shoulder or hand?: Mild Difficulty  Number of Questions Answered: 11  Quick DASH Score: 43.18         Time Calculation:   Start Time: 1345     Therapy Charges for Today     Code Description Service Date Service Provider Modifiers Qty    53982100234 HC PT EVAL LOW COMPLEXITY 4 12/18/2017 Corinne E Perkins, PT GP 1          PT G-Codes  Outcome Measure Options: Quick DASH         Corinne E. Perkins, PT  12/18/2017

## 2017-12-21 ENCOUNTER — HOSPITAL ENCOUNTER (OUTPATIENT)
Dept: PHYSICAL THERAPY | Facility: HOSPITAL | Age: 47
Setting detail: THERAPIES SERIES
Discharge: HOME | End: 2017-12-21

## 2017-12-21 DIAGNOSIS — M77.11 RIGHT LATERAL EPICONDYLITIS: Primary | ICD-10-CM

## 2017-12-21 DIAGNOSIS — G89.29 CHRONIC ELBOW PAIN, RIGHT: ICD-10-CM

## 2017-12-21 DIAGNOSIS — M25.521 CHRONIC ELBOW PAIN, RIGHT: ICD-10-CM

## 2017-12-21 PROCEDURE — 97110 THERAPEUTIC EXERCISES: CPT | Performed by: PHYSICAL THERAPIST

## 2017-12-21 NOTE — THERAPY TREATMENT NOTE
Outpatient Physical Therapy Ortho Treatment Note  [facility]     Patient Name: Lucero Carrion  : 1970  MRN: [id number]  Today's Date: 2017      Visit Date: 2017    Visit Dx:    ICD-10-CM ICD-9-CM   1. Right lateral epicondylitis M77.11 726.32   2. Chronic elbow pain, right M25.521 719.42    G89.29 338.29       Patient Active Problem List   Diagnosis   • Asthma   • Arthritis   • Rheumatoid arthritis with positive rheumatoid factor   • Perimenopausal        Past Medical History:   Diagnosis Date   • Arthritis     RHEUMATOID   • Asthma         Past Surgical History:   Procedure Laterality Date   • FINGER FRACTURE SURGERY Left                              PT Assessment/Plan       17 1400       PT Assessment    Assessment Comments Pt tolerated initial ther ex for generalized RUE strengthening and eccentric strengthening of R elbow. She denies increase pain with ther ex in clinic, following cues and demo from PT for correct technique. Pt given written HEP and yellow theraband for home use after demonstrating understanding in clinic.   -CP     PT Plan    PT Plan Comments Assess compliance with HEP. Progress as tolerated in clinic with resistance and weight as appropriate for lateral epicondylitis. Could trial DN to R elbow as appropriate.   -CP       User Key  (r) = Recorded By, (t) = Taken By, (c) = Cosigned By    Initials Name Provider Type    CP Corinne E Perkins, PT Physical Therapist                    Exercises       17 1400          Subjective Comments    Subjective Comments Pt states right elbow pain is the same, comes and goes intermittently with activty and at rest. She noted pain with gripping horse feed bag, wrist extension motions, and pronation.   -CP      Subjective Pain    Able to rate subjective pain? yes  -CP      Pre-Treatment Pain Level 3  -CP      Post-Treatment Pain Level 2  -CP      Exercise 1    Exercise Name 1 UBE SciFit  -CP      Cueing 1 Verbal  -CP      Time  (Minutes) 1 4   2 min forward, 2 min backward  -CP      Exercise 2    Exercise Name 2 Mid Rows  -CP      Cueing 2 Verbal;Tactile;Demo  -CP      Reps 2 12  -CP      Additional Comments red theraband  -CP      Exercise 3    Exercise Name 3 Low Rows  -CP      Cueing 3 Verbal;Demo  -CP      Reps 3 12  -CP      Additional Comments red theraband  -CP      Exercise 4    Exercise Name 4 Sternal lifts  -CP      Cueing 4 Verbal;Tactile  -CP      Reps 4 12  -CP      Additional Comments yellow theraband  -CP      Exercise 5    Exercise Name 5 biceps curl  -CP      Cueing 5 Verbal  -CP      Sets 5 2   1st set hammer curl with yellow band, 2nd neutral  red b  -CP      Reps 5 10  -CP      Additional Comments yellow/red   -CP      Exercise 6    Exercise Name 6 Wall push up plus   modified AROM  -CP      Cueing 6 Verbal  -CP      Reps 6 8  -CP      Exercise 7    Exercise Name 7 shoulder IR/ER  -CP      Cueing 7 Demo  -CP      Reps 7 10   each  -CP      Additional Comments red theraband  -CP      Exercise 8    Exercise Name 8 Pronation/supination weighted, arm supported  -CP      Cueing 8 Verbal  -CP      Reps 8 10  -CP      Additional Comments 1lb DB  -CP      Exercise 9    Exercise Name 9 Eccentric wrist extension lowering/raising, arm supported  -CP      Cueing 9 Verbal  -CP      Reps 9 10  -CP      Additional Comments 1lb DB  -CP        User Key  (r) = Recorded By, (t) = Taken By, (c) = Cosigned By    Initials Name Provider Type    CP Corinne E Perkins, PT Physical Therapist                             Therapy Education  Education Details: HEP: yellow theraband for bicep curls, red theraband for mid rows, lows rows, IR/ER. Wall push up plus, and 1lb weighted pronation/supination and wrist extension. See written copy in chart.               Time Calculation:   Start Time: 1435  Total Timed Code Minutes- PT: 34 minute(s)    Therapy Charges for Today     Code Description Service Date Service Provider Modifiers Qty    72443388158  HC PT THER PROC EA 15 MIN 12/21/2017 Corinne E Perkins, PT GP 2                    Corinne E. Perkins, PT  12/21/2017

## 2017-12-22 ENCOUNTER — LAB (OUTPATIENT)
Dept: INTERNAL MEDICINE | Facility: CLINIC | Age: 47
End: 2017-12-22

## 2017-12-22 DIAGNOSIS — Z00.00 ANNUAL PHYSICAL EXAM: ICD-10-CM

## 2017-12-22 LAB
ALBUMIN SERPL-MCNC: 4.2 G/DL (ref 3.2–4.8)
ALBUMIN/GLOB SERPL: 1.6 G/DL (ref 1.5–2.5)
ALP SERPL-CCNC: 82 U/L (ref 25–100)
ALT SERPL W P-5'-P-CCNC: 29 U/L (ref 7–40)
ANION GAP SERPL CALCULATED.3IONS-SCNC: 8 MMOL/L (ref 3–11)
ARTICHOKE IGE QN: 103 MG/DL (ref 0–130)
AST SERPL-CCNC: 31 U/L (ref 0–33)
BASOPHILS # BLD AUTO: 0.03 10*3/MM3 (ref 0–0.2)
BASOPHILS NFR BLD AUTO: 0.6 % (ref 0–1)
BILIRUB BLD-MCNC: NEGATIVE MG/DL
BILIRUB SERPL-MCNC: 0.7 MG/DL (ref 0.3–1.2)
BUN BLD-MCNC: 10 MG/DL (ref 9–23)
BUN/CREAT SERPL: 12.5 (ref 7–25)
CALCIUM SPEC-SCNC: 9.3 MG/DL (ref 8.7–10.4)
CHLORIDE SERPL-SCNC: 105 MMOL/L (ref 99–109)
CHOLEST SERPL-MCNC: 242 MG/DL (ref 0–200)
CLARITY, POC: CLEAR
CO2 SERPL-SCNC: 27 MMOL/L (ref 20–31)
COLOR UR: YELLOW
CREAT BLD-MCNC: 0.8 MG/DL (ref 0.6–1.3)
DEPRECATED RDW RBC AUTO: 39.3 FL (ref 37–54)
EOSINOPHIL # BLD AUTO: 0.16 10*3/MM3 (ref 0–0.3)
EOSINOPHIL NFR BLD AUTO: 3 % (ref 0–3)
ERYTHROCYTE [DISTWIDTH] IN BLOOD BY AUTOMATED COUNT: 12.3 % (ref 11.3–14.5)
EXPIRATION DATE: NORMAL
GFR SERPL CREATININE-BSD FRML MDRD: 77 ML/MIN/1.73
GLOBULIN UR ELPH-MCNC: 2.7 GM/DL
GLUCOSE BLD-MCNC: 95 MG/DL (ref 70–100)
GLUCOSE UR STRIP-MCNC: NEGATIVE MG/DL
HCT VFR BLD AUTO: 44.2 % (ref 34.5–44)
HDLC SERPL-MCNC: 108 MG/DL (ref 40–60)
HGB BLD-MCNC: 15 G/DL (ref 11.5–15.5)
IMM GRANULOCYTES # BLD: 0.01 10*3/MM3 (ref 0–0.03)
IMM GRANULOCYTES NFR BLD: 0.2 % (ref 0–0.6)
KETONES UR QL: NEGATIVE
LEUKOCYTE EST, POC: NEGATIVE
LYMPHOCYTES # BLD AUTO: 2.29 10*3/MM3 (ref 0.6–4.8)
LYMPHOCYTES NFR BLD AUTO: 42.8 % (ref 24–44)
Lab: NORMAL
MCH RBC QN AUTO: 29.4 PG (ref 27–31)
MCHC RBC AUTO-ENTMCNC: 33.9 G/DL (ref 32–36)
MCV RBC AUTO: 86.7 FL (ref 80–99)
MONOCYTES # BLD AUTO: 0.4 10*3/MM3 (ref 0–1)
MONOCYTES NFR BLD AUTO: 7.5 % (ref 0–12)
NEUTROPHILS # BLD AUTO: 2.46 10*3/MM3 (ref 1.5–8.3)
NEUTROPHILS NFR BLD AUTO: 45.9 % (ref 41–71)
NITRITE UR-MCNC: NEGATIVE MG/ML
PH UR: 6 [PH] (ref 5–8)
PLATELET # BLD AUTO: 229 10*3/MM3 (ref 150–450)
PMV BLD AUTO: 9.9 FL (ref 6–12)
POTASSIUM BLD-SCNC: 4 MMOL/L (ref 3.5–5.5)
PROT SERPL-MCNC: 6.9 G/DL (ref 5.7–8.2)
PROT UR STRIP-MCNC: NEGATIVE MG/DL
RBC # BLD AUTO: 5.1 10*6/MM3 (ref 3.89–5.14)
RBC # UR STRIP: NEGATIVE /UL
SODIUM BLD-SCNC: 140 MMOL/L (ref 132–146)
SP GR UR: 1.02 (ref 1–1.03)
TRIGL SERPL-MCNC: 64 MG/DL (ref 0–150)
TSH SERPL DL<=0.05 MIU/L-ACNC: 1.1 MIU/ML (ref 0.35–5.35)
UROBILINOGEN UR QL: NORMAL
WBC NRBC COR # BLD: 5.35 10*3/MM3 (ref 3.5–10.8)

## 2017-12-22 PROCEDURE — 80050 GENERAL HEALTH PANEL: CPT | Performed by: NURSE PRACTITIONER

## 2017-12-22 PROCEDURE — 36415 COLL VENOUS BLD VENIPUNCTURE: CPT | Performed by: NURSE PRACTITIONER

## 2017-12-22 PROCEDURE — 80061 LIPID PANEL: CPT | Performed by: NURSE PRACTITIONER

## 2017-12-26 ENCOUNTER — HOSPITAL ENCOUNTER (OUTPATIENT)
Dept: PHYSICAL THERAPY | Facility: HOSPITAL | Age: 47
Setting detail: THERAPIES SERIES
Discharge: HOME | End: 2017-12-26

## 2017-12-26 DIAGNOSIS — G89.29 CHRONIC ELBOW PAIN, RIGHT: ICD-10-CM

## 2017-12-26 DIAGNOSIS — M77.11 RIGHT LATERAL EPICONDYLITIS: Primary | ICD-10-CM

## 2017-12-26 DIAGNOSIS — M25.521 CHRONIC ELBOW PAIN, RIGHT: ICD-10-CM

## 2017-12-26 PROCEDURE — 97110 THERAPEUTIC EXERCISES: CPT | Performed by: PHYSICAL THERAPIST

## 2017-12-26 PROCEDURE — 97140 MANUAL THERAPY 1/> REGIONS: CPT | Performed by: PHYSICAL THERAPIST

## 2017-12-26 NOTE — THERAPY TREATMENT NOTE
Outpatient Physical Therapy Ortho Treatment Note  [facility]     Patient Name: uLcero Carrion  : 1970  MRN: [id number]  Today's Date: 2017      Visit Date: 2017    Visit Dx:    ICD-10-CM ICD-9-CM   1. Right lateral epicondylitis M77.11 726.32   2. Chronic elbow pain, right M25.521 719.42    G89.29 338.29       Patient Active Problem List   Diagnosis   • Asthma   • Arthritis   • Rheumatoid arthritis with positive rheumatoid factor   • Perimenopausal        Past Medical History:   Diagnosis Date   • Arthritis     RHEUMATOID   • Asthma         Past Surgical History:   Procedure Laterality Date   • FINGER FRACTURE SURGERY Left                              PT Assessment/Plan       17 1400       PT Assessment    Assessment Comments Patient responds favorably to STM and DN in the clinic.  She does demonstrate minor increase in pain with resisted elbow movements  -DADA     PT Plan    PT Plan Comments restart eccentrics, continue DN and STM  Could consider iASTM  -DADA       User Key  (r) = Recorded By, (t) = Taken By, (c) = Cosigned By    Initials Name Provider Type    DADA Melara, PT Physical Therapist                    Exercises       17 1300          Subjective Comments    Subjective Comments Patient states that her pain is increased a little today.  She is frustrated because she has difficulty identifying aggravating factors.    -DADA      Subjective Pain    Able to rate subjective pain? yes  -DADA      Pre-Treatment Pain Level 5  -DADA      Post-Treatment Pain Level 3  -DADA      Exercise 2    Exercise Name 2 Mid Rows  -DADA      Reps 2 15  -DADA      Additional Comments red band  -DADA      Exercise 3    Exercise Name 3 Low rows  -DADA      Reps 3 15  -DADA      Additional Comments re band  -DADA      Exercise 4    Exercise Name 4 Sternal Lift  -DADA      Reps 4 15  -DADA      Additional Comments red band  -DADA      Exercise 5    Exercise Name 5 biceps curl/hammer curl  -DADA      Sets 5 2  -DADA      Reps 5  10  -DADA      Additional Comments red each.  Patient has mild increase in pain  -DADA      Exercise 6    Exercise Name 6 CT1 stretch  -DADA      Sets 6 3  -DADA      Time (Seconds) 6 30  -DADA      Exercise 7    Exercise Name 7 Wrist Extensor stretch  -DADA      Sets 7 3  -DADA      Time (Seconds) 7 30  -DADA        User Key  (r) = Recorded By, (t) = Taken By, (c) = Cosigned By    Initials Name Provider Type    DADA Melara PT Physical Therapist                        Manual Rx (last 36 hours)      Manual Treatments       12/26/17 1300          Manual Rx 1    Manual Rx 1 Location CFM to extensor bundle  -DADA      Manual Rx 1 Duration 5 minutes  -DADA      Manual Rx 2    Manual Rx 2 Location elbow lateral epidcondyle  -DADA      Manual Rx 2 Type DN  -DADA      Manual Rx 2 Duration 5 minutes  -DADA        User Key  (r) = Recorded By, (t) = Taken By, (c) = Cosigned By    Initials Name Provider Type    DADA Melara PT Physical Therapist              Therapy Education  Education Details: given red band for HEP  Given: HEP  Program: New  How Provided: Demonstration, Verbal  Provided to: Patient  Level of Understanding: Verbalized, Demonstrated              Time Calculation:   Start Time: 1344  Total Timed Code Minutes- PT: 40 minute(s)    Therapy Charges for Today     Code Description Service Date Service Provider Modifiers Qty    47643009679 HC PT THER PROC EA 15 MIN 12/26/2017 Leo Melara, PT GP 2    76533612851 HC PT MANUAL THERAPY EA 15 MIN 12/26/2017 Leo Melara, PT GP 1                    Leo Melara, PT  12/26/2017

## 2018-01-02 ENCOUNTER — APPOINTMENT (OUTPATIENT)
Dept: PHYSICAL THERAPY | Facility: HOSPITAL | Age: 48
End: 2018-01-02

## 2018-01-04 ENCOUNTER — APPOINTMENT (OUTPATIENT)
Dept: PHYSICAL THERAPY | Facility: HOSPITAL | Age: 48
End: 2018-01-04

## 2018-01-09 ENCOUNTER — HOSPITAL ENCOUNTER (OUTPATIENT)
Dept: PHYSICAL THERAPY | Facility: HOSPITAL | Age: 48
Setting detail: THERAPIES SERIES
Discharge: HOME | End: 2018-01-09

## 2018-01-09 DIAGNOSIS — M77.11 RIGHT LATERAL EPICONDYLITIS: Primary | ICD-10-CM

## 2018-01-09 DIAGNOSIS — G89.29 CHRONIC ELBOW PAIN, RIGHT: ICD-10-CM

## 2018-01-09 DIAGNOSIS — M25.521 CHRONIC ELBOW PAIN, RIGHT: ICD-10-CM

## 2018-01-09 PROCEDURE — 97140 MANUAL THERAPY 1/> REGIONS: CPT | Performed by: PHYSICAL THERAPIST

## 2018-01-09 PROCEDURE — 97110 THERAPEUTIC EXERCISES: CPT | Performed by: PHYSICAL THERAPIST

## 2018-01-09 NOTE — THERAPY TREATMENT NOTE
Outpatient Physical Therapy Ortho Treatment Note   [facility]     Patient Name: Lucero Carrion  : 1970  MRN: [id number]  Today's Date: 2018      Visit Date: 2018    Visit Dx:    ICD-10-CM ICD-9-CM   1. Right lateral epicondylitis M77.11 726.32   2. Chronic elbow pain, right M25.521 719.42    G89.29 338.29       Patient Active Problem List   Diagnosis   • Asthma   • Arthritis   • Rheumatoid arthritis with positive rheumatoid factor   • Perimenopausal        Past Medical History:   Diagnosis Date   • Arthritis     RHEUMATOID   • Asthma         Past Surgical History:   Procedure Laterality Date   • FINGER FRACTURE SURGERY Left                              PT Assessment/Plan       18 1500       PT Assessment    Assessment Comments Pt tolerated progression of weight bearing through RUE, denies change of pain. Decrease noted post manual techniques.   -CP     PT Plan    PT Plan Comments Progress in clinic next visit with increased resistance and eccentric focus. Repeat manual as tolerated/appropriate. Could add US.   -CP       User Key  (r) = Recorded By, (t) = Taken By, (c) = Cosigned By    Initials Name Provider Type    CP Corinne E Perkins, PT Physical Therapist                    Exercises       18 1300          Subjective Pain    Able to rate subjective pain? yes  -CP      Pre-Treatment Pain Level 2  -CP      Post-Treatment Pain Level 1  -CP      Exercise 2    Exercise Name 2 Mid Rows  -CP      Reps 2 15  -CP      Additional Comments Green theraband  -CP      Exercise 3    Exercise Name 3 Low rows  -CP      Reps 3 15  -CP      Additional Comments green theraband  -CP      Exercise 4    Exercise Name 4 Sternal Lift  -CP      Reps 4 15  -CP      Additional Comments green theraband  -CP      Exercise 5    Exercise Name 5 biceps curl/hammer curl  -CP      Reps 5 12  -CP      Additional Comments Green theraband  -CP      Exercise 6    Exercise Name 6 wall push up plus  -CP      Reps 6 12  -CP       Exercise 7    Exercise Name 7 Wrist Extensor stretch  -CP      Sets 7 3  -CP      Time (Seconds) 7 30  -CP      Exercise 8    Exercise Name 8 Pronation/supination weighted, arm supported  -CP      Reps 8 10  -CP      Additional Comments 2lb   -CP        User Key  (r) = Recorded By, (t) = Taken By, (c) = Cosigned By    Initials Name Provider Type    CP Corinne E Perkins, PT Physical Therapist                        Manual Rx (last 36 hours)      Manual Treatments       01/09/18 1500          Manual Rx 2    Manual Rx 2 Location elbow lateral epidcondyle  -DADA      Manual Rx 2 Type DN  -DADA      Manual Rx 2 Duration 5 minutes  -DADA      Manual Rx 3    Manual Rx 3 Location elbow R  -CP      Manual Rx 3 Type STM techniques along epicondyle into wrist extensors, triceps, and pronator teres.   -CP      Manual Rx 3 Duration 4 min  -CP        User Key  (r) = Recorded By, (t) = Taken By, (c) = Cosigned By    Initials Name Provider Type    DADA Melara, PT Physical Therapist    CP Corinne E Perkins, PT Physical Therapist                             Time Calculation:   Start Time: 1345  Total Timed Code Minutes- PT: 38 minute(s)    Therapy Charges for Today     Code Description Service Date Service Provider Modifiers Qty    99415557257 HC PT THER PROC EA 15 MIN 1/9/2018 Corinne E Perkins, PT GP 2    19128448943 HC PT MANUAL THERAPY EA 15 MIN 1/9/2018 Corinne E Perkins, PT GP 1                    Corinne E. Perkins, PT  1/9/2018

## 2018-01-11 ENCOUNTER — APPOINTMENT (OUTPATIENT)
Dept: PHYSICAL THERAPY | Facility: HOSPITAL | Age: 48
End: 2018-01-11

## 2018-01-16 ENCOUNTER — APPOINTMENT (OUTPATIENT)
Dept: PHYSICAL THERAPY | Facility: HOSPITAL | Age: 48
End: 2018-01-16

## 2018-01-18 ENCOUNTER — APPOINTMENT (OUTPATIENT)
Dept: PHYSICAL THERAPY | Facility: HOSPITAL | Age: 48
End: 2018-01-18

## 2018-01-23 ENCOUNTER — APPOINTMENT (OUTPATIENT)
Dept: PHYSICAL THERAPY | Facility: HOSPITAL | Age: 48
End: 2018-01-23

## 2018-01-25 ENCOUNTER — APPOINTMENT (OUTPATIENT)
Dept: PHYSICAL THERAPY | Facility: HOSPITAL | Age: 48
End: 2018-01-25

## 2018-01-30 ENCOUNTER — APPOINTMENT (OUTPATIENT)
Dept: PHYSICAL THERAPY | Facility: HOSPITAL | Age: 48
End: 2018-01-30

## 2018-02-01 ENCOUNTER — APPOINTMENT (OUTPATIENT)
Dept: PHYSICAL THERAPY | Facility: HOSPITAL | Age: 48
End: 2018-02-01

## 2018-02-02 ENCOUNTER — DOCUMENTATION (OUTPATIENT)
Dept: PHYSICAL THERAPY | Facility: HOSPITAL | Age: 48
End: 2018-02-02

## 2018-02-02 DIAGNOSIS — M77.11 RIGHT LATERAL EPICONDYLITIS: Primary | ICD-10-CM

## 2018-02-02 DIAGNOSIS — M25.521 CHRONIC ELBOW PAIN, RIGHT: ICD-10-CM

## 2018-02-02 DIAGNOSIS — G89.29 CHRONIC ELBOW PAIN, RIGHT: ICD-10-CM

## 2018-02-02 NOTE — THERAPY DISCHARGE NOTE
Outpatient Physical Therapy Discharge Summary         Patient Name: Lucero Carrion  : 1970  MRN: [id number]    Today's Date: 2018    Visit Dx:    ICD-10-CM ICD-9-CM   1. Right lateral epicondylitis M77.11 726.32   2. Chronic elbow pain, right M25.521 719.42    G89.29 338.29             PT OP Goals       18 0900       PT Short Term Goals    STG Date to Achieve 18  -CP     STG 1 Patient will demonstrate independence with home exercise program.  -CP     STG 1 Progress Met  -CP     STG 2 Patient will report pain 1-2 of 10 at rest on affected R side.  -CP     STG 2 Progress Not Met  -CP     STG 3 Patient will open jar, write, prepare meal, carry/lift object up to 10 # without complaint of pain in right elbow.   -CP     STG 3 Progress Not Met  -CP     Long Term Goals    LTG Date to Achieve 18  -CP     LTG 1 Patient will report overall Quick Dash improvements > 15 points to demonstrate increased independence with daily tasks.  -CP     LTG 1 Progress Not Met  -CP     LTG 2 Patient will manage simulated work activities with pain less than 1/10 on R elbow.  -CP     LTG 2 Progress Met  -CP     LTG 3 Pt. will be independent and compliant with advanced home exercise program to facilitate self-management of symptoms.  -CP     LTG 3 Progress Not Met  -CP     LTG 4 Pt will return to modified home yoga techniques 3x/week without reproduction of R elbow symptoms.   -CP     LTG 4 Progress Not Met  -CP       User Key  (r) = Recorded By, (t) = Taken By, (c) = Cosigned By    Initials Name Provider Type    CP Corinne E Perkins, PT Physical Therapist          OP PT Discharge Summary  Date of Discharge: 18  Reason for Discharge: Patient/Caregiver request, Independent  Outcomes Achieved: Patient able to partially acheive established goals  Discharge Destination: Home with home program  Discharge Instructions: Pt was seen for PT IE and 3 treatments. She had 4 cancellations, requested to be d/c from OPPT to  home with HEP. Unable to assess functional status prior to d/c d/t freq cancellations and lack of follow up.       Time Calculation:                    Corinne E. Perkins, PT  2/2/2018

## 2018-02-26 ENCOUNTER — OFFICE VISIT (OUTPATIENT)
Dept: OBSTETRICS AND GYNECOLOGY | Facility: CLINIC | Age: 48
End: 2018-02-26

## 2018-02-26 ENCOUNTER — LAB (OUTPATIENT)
Dept: LAB | Facility: HOSPITAL | Age: 48
End: 2018-02-26

## 2018-02-26 VITALS — WEIGHT: 126 LBS | BODY MASS INDEX: 21.3 KG/M2 | SYSTOLIC BLOOD PRESSURE: 120 MMHG | DIASTOLIC BLOOD PRESSURE: 62 MMHG

## 2018-02-26 DIAGNOSIS — Z01.419 WOMEN'S ANNUAL ROUTINE GYNECOLOGICAL EXAMINATION: Primary | ICD-10-CM

## 2018-02-26 DIAGNOSIS — Z79.890 HORMONE REPLACEMENT THERAPY (HRT): ICD-10-CM

## 2018-02-26 DIAGNOSIS — Z01.419 WOMEN'S ANNUAL ROUTINE GYNECOLOGICAL EXAMINATION: ICD-10-CM

## 2018-02-26 LAB
ESTRADIOL SERPL HS-MCNC: 125 PG/ML
FSH SERPL-ACNC: 85.8 MIU/ML
TSH SERPL DL<=0.05 MIU/L-ACNC: 1.35 MIU/ML (ref 0.35–5.35)

## 2018-02-26 PROCEDURE — 36415 COLL VENOUS BLD VENIPUNCTURE: CPT

## 2018-02-26 PROCEDURE — 82670 ASSAY OF TOTAL ESTRADIOL: CPT

## 2018-02-26 PROCEDURE — 99396 PREV VISIT EST AGE 40-64: CPT | Performed by: NURSE PRACTITIONER

## 2018-02-26 PROCEDURE — 83001 ASSAY OF GONADOTROPIN (FSH): CPT

## 2018-02-26 PROCEDURE — 84443 ASSAY THYROID STIM HORMONE: CPT

## 2018-02-28 ENCOUNTER — TELEPHONE (OUTPATIENT)
Dept: GYNECOLOGIC ONCOLOGY | Facility: CLINIC | Age: 48
End: 2018-02-28

## 2018-02-28 NOTE — TELEPHONE ENCOUNTER
Attempted to call patient to notify of lab results. No answer, left message for her to call back at her convenience.

## 2018-03-01 ENCOUNTER — TELEPHONE (OUTPATIENT)
Dept: OBSTETRICS AND GYNECOLOGY | Facility: CLINIC | Age: 48
End: 2018-03-01

## 2018-03-01 NOTE — TELEPHONE ENCOUNTER
Tesha Thornton Pt    Pt returned a call regarding lab results.   Pt number [phone].    *Attempted to addend note from 2/28/18 but was unable to open

## 2018-03-02 NOTE — TELEPHONE ENCOUNTER
[phone] returned patient's call regarding her lab results. Spoke with patient and advised her her TSH is normal, her FSH is up 10-12 points and her Estradiol is up 20 points as expected due to patient being on hormone replacement therapy. I also advised her to continue her current therapy. Patient verbalized understanding.

## 2018-03-02 NOTE — PROGRESS NOTES
[facility] ANNUAL [facility]AN VISIT      Lucero Carrion  [id number]  1970      Chief Complaint: Gynecologic Exam        History of present illness:    Lucero Carrion is a 47 y.o. year old  menopausal female presenting to be seen for her annual exam.  This past year she has been on hormone replacement therapy.  There has not been vaginal bleeding in the last 12 months.  Menopausal symptoms are improved on her new HRT.     SEXUAL Hx:  She is currently sexually active.  In the past year there has not been new sexual partners.    Condoms ARE typically used.  She would not like to be screened for STD's at today's exam.  HEALTH Hx:  She exercises regularly: yes, rides horses, works actively on horse farm  She wears her seat belt:yes.  She has concerns about domestic violence: no.  She has noticed changes in height: no.   She is a non-smoker.    Past Medical History:   Diagnosis Date   • Arthritis     RHEUMATOID   • Asthma        Past Surgical History:   Procedure Laterality Date   • FINGER FRACTURE SURGERY Left    • WISDOM TOOTH EXTRACTION         MEDICATIONS: The current medication list was reviewed and reconciled.     Allergies:  has No Known Allergies.    Family History   Problem Relation Age of Onset   • Osteoporosis Mother    • Asthma Father    • Breast cancer Neg Hx    • Ovarian cancer Neg Hx    • BRCA 1/2 Neg Hx        Health Maintenance:  Last mammogram was 2017. Last pap smear was 2016, results negative. She does not have a history of abnormal pap smears.     Review of Systems   Constitutional: Negative for fatigue, fever and unexpected weight change.   HENT: Negative for congestion, ear pain, hearing loss, sinus pressure and trouble swallowing.    Eyes: Negative for visual disturbance.   Respiratory: Negative for cough, chest tightness, shortness of breath and wheezing.    Cardiovascular: Negative for chest pain, palpitations and leg swelling.   Gastrointestinal: Negative for abdominal  distention, abdominal pain, constipation, diarrhea, nausea and vomiting.   Endocrine: Negative for cold intolerance, heat intolerance, polydipsia, polyphagia and polyuria.   Genitourinary: Negative for difficulty urinating, dyspareunia, dysuria, frequency, hematuria, pelvic pain, urgency, vaginal bleeding, vaginal discharge and vaginal pain.   Musculoskeletal: Negative for arthralgias, gait problem, joint swelling and myalgias.   Skin: Negative for color change, pallor and rash.   Neurological: Negative for dizziness, seizures, syncope, weakness, light-headedness, numbness and headaches.   Hematological: Negative for adenopathy. Does not bruise/bleed easily.   Psychiatric/Behavioral: Negative for agitation, confusion, sleep disturbance and suicidal ideas. The patient is not nervous/anxious.        Physical Exam  Vital Signs: /62 (Patient Position: Sitting)  Wt 57.2 kg (126 lb)  BMI 21.3 kg/m2   General Appearance:  alert, cooperative, no apparent distress, appears stated age and normal weight   Neurologic/Psychiatric: A&O x 3, gait steady, appropriate affect   HEENT:  Normocephalic, without obvious abnormality, mucous membranes moist   Neck: Supple, symmetrical, trachea midline, no adenopathy;  No thyromegaly, masses, or tenderness   Back:   Symmetric, no curvature, ROM normal, no CVA tenderness   Lungs:   Clear to auscultation bilaterally; respirations regular, even, and unlabored bilaterally   Heart:  Regular rate and rhythm, no murmurs appreciated   Breasts:  Symmetrical, no masses, no lesions and no nipple discharge   Abdomen:   Soft, non-tender, non-distended and no organomegaly   Lymph nodes: No cervical, supraclavicular, inguinal or axillary adenopathy noted   Extremities: Normal, atraumatic; no clubbing, cyanosis, or edema    Skin: No rashes, ulcers, or suspicious lesions noted   Pelvic: External Genitalia  without lesions or skin changes  Vagina  is pink, moist, without lesions.   Cervix  normal,  without lesions, no discharge, no CMT and pap smear obtained  Uterus  normal size, midposition, mobile and nontender  Ovaries  without palpable masses or fullness  Parametria  smooth  Rectovaginal  Female rectovaginal: deferred       Procedure Note:  No notes on file    Assessment and Plan:    Lucero was seen today for gynecologic exam.    Diagnoses and all orders for this visit:    Women's annual routine gynecological examination  -     Liquid-based Pap Smear, Screening  -     Follicle Stimulating Hormone; Future  -     Estradiol; Future  -     TSH; Future    Hormone replacement therapy (HRT)        Pap was done today per patient request even though it has been less then 3 years since her last Pap smear.  If she does not receive the results of the Pap within 2 weeks  time, she was instructed to call to find out the results.  I explained to Lucero that the recommendations for Pap smear interval in a low risk patient's has lengthened to 3 years time.  I encouraged her to be seen yearly for a full physical exam including breast and pelvic exam even during the off years when PAP's will not be performed.    She was encouraged to get yearly mammograms.  She should report any palpable breast lump(s) or skin changes regardless of mammographic findings.  I explained to Lucero that notification regarding her mammogram results will come from the center performing the study.  Our office will not be routinely calling with mammogram results.  It is her responsibility to make sure that the results from the mammogram are communicated to her by the breast center.  If she has any questions about the results, she is welcome to call our office anytime.    She was recommended to begin colonoscopy at age 50 for colon cancer screening and bone density scans (DEXA) at age 60-65 for osteoporosis screening.    Good improvement of menopausal symptoms on new HRT regimen with Minivelle patch and PO Prometrium. Informed patient little benefit to  repeating labs as they can be masked by medications, but she still requests to have these repeated. As her symptoms are much improved and she is happy with her current regimen, it is unlikely that any dose adjustments would be made based upon blood values alone. She v/u. She will be notified of results upon their return.     Return in about 1 year (around 2/26/2019) for annual exam or PRN.      WENDY Carrizales      Note: Speech recognition transcription software was used to dictate portions of this document.  An attempt at proofreading has been made though minor errors in transcription may still be present.  Please do not hesitate to call our office with any questions.

## 2018-06-04 ENCOUNTER — TRANSCRIBE ORDERS (OUTPATIENT)
Dept: ADMINISTRATIVE | Facility: HOSPITAL | Age: 48
End: 2018-06-04

## 2018-08-21 ENCOUNTER — APPOINTMENT (OUTPATIENT)
Dept: MAMMOGRAPHY | Facility: HOSPITAL | Age: 48
End: 2018-08-21

## 2018-11-06 ENCOUNTER — APPOINTMENT (OUTPATIENT)
Dept: MAMMOGRAPHY | Facility: HOSPITAL | Age: 48
End: 2018-11-06

## 2018-11-13 ENCOUNTER — TRANSCRIBE ORDERS (OUTPATIENT)
Dept: MAMMOGRAPHY | Facility: HOSPITAL | Age: 48
End: 2018-11-13

## 2018-11-13 ENCOUNTER — HOSPITAL ENCOUNTER (OUTPATIENT)
Dept: MAMMOGRAPHY | Facility: HOSPITAL | Age: 48
Discharge: HOME | End: 2018-11-13
Admitting: RADIOLOGY

## 2018-11-13 DIAGNOSIS — R92.8 ABNORMAL MAMMOGRAM: Primary | ICD-10-CM

## 2018-11-13 DIAGNOSIS — R92.8 ABNORMAL MAMMOGRAM: ICD-10-CM

## 2018-11-13 PROCEDURE — G0279 TOMOSYNTHESIS, MAMMO: HCPCS

## 2018-11-13 PROCEDURE — 77066 DX MAMMO INCL CAD BI: CPT | Performed by: RADIOLOGY

## 2018-11-13 PROCEDURE — 77062 BREAST TOMOSYNTHESIS BI: CPT | Performed by: RADIOLOGY

## 2018-11-13 PROCEDURE — 77066 DX MAMMO INCL CAD BI: CPT

## 2018-12-13 RX ORDER — ESTRADIOL 0.05 MG/D
FILM, EXTENDED RELEASE TRANSDERMAL
Refills: 11 | OUTPATIENT
Start: 2018-12-13

## 2018-12-14 RX ORDER — ESTRADIOL 0.05 MG/D
1 FILM, EXTENDED RELEASE TRANSDERMAL 2 TIMES WEEKLY
Qty: 8 PATCH | Refills: 3 | Status: SHIPPED | OUTPATIENT
Start: 2018-12-17 | End: 2019-04-09 | Stop reason: SDUPTHER

## 2019-03-11 PROBLEM — N95.1 SYMPTOMATIC MENOPAUSAL OR FEMALE CLIMACTERIC STATES: Status: ACTIVE | Noted: 2019-03-11

## 2019-03-11 PROBLEM — Z01.419 WELL WOMAN EXAM: Status: ACTIVE | Noted: 2019-03-11

## 2019-03-12 ENCOUNTER — OFFICE VISIT (OUTPATIENT)
Dept: OBSTETRICS AND GYNECOLOGY | Facility: CLINIC | Age: 49
End: 2019-03-12

## 2019-03-12 VITALS
BODY MASS INDEX: 21.19 KG/M2 | DIASTOLIC BLOOD PRESSURE: 80 MMHG | SYSTOLIC BLOOD PRESSURE: 124 MMHG | HEIGHT: 65 IN | WEIGHT: 127.2 LBS

## 2019-03-12 DIAGNOSIS — Z01.419 WELL WOMAN EXAM: Primary | ICD-10-CM

## 2019-03-12 DIAGNOSIS — N95.1 SYMPTOMATIC MENOPAUSAL OR FEMALE CLIMACTERIC STATES: ICD-10-CM

## 2019-03-12 PROCEDURE — 99396 PREV VISIT EST AGE 40-64: CPT | Performed by: OBSTETRICS & GYNECOLOGY

## 2019-03-12 NOTE — PROGRESS NOTES
Subjective   Chief Complaint   Patient presents with   • Gynecologic Exam     pt here for annual. last pap 18 negative, last mammo 18 prob benign finding in left breast. states no c/o     Lucero Carrion is a 48 y.o. year old  menopausal female presenting to be seen for her annual exam.  This past year she has been on hormone replacement therapy since 2017.  There has not been vaginal bleeding in the last 6 months.  Menopausal symptoms are not present.    SEXUAL Hx:  She is currently sexually active. .  In the past year there there has been NO new sexual partners.    Condoms are never used.  She would not like to be screened for STD's at today's exam.  [place] is painful: no  HEALTH Hx:  She exercises regularly: yes, works with horses  She wears her seat belt: yes.  She has concerns about domestic violence: no.  She has noticed changes in height: not asked.  OTHER THINGS SHE WANTS TO DISCUSS TODAY:  Nothing else    The following portions of the patient's history were reviewed and updated as appropriate:problem list, current medications, allergies, past family history, past medical history, past social history and past surgical history.    Social History    Tobacco Use      Smoking status: Former Smoker        Packs/day: 0.50        Years: 10.00        Pack years: 5        Types: Cigarettes        Quit date:         Years since quitting: 10.1      Smokeless tobacco: Never Used      Review of Systems  Constitutional POS: nothing reported    NEG: anorexia or night sweats   Genitourinary POS: nothing reported    NEG: dysuria or hematuria      Gastointestinal POS: nothing reported    NEG: bloating, change in bowel habits, melena or reflux symptoms   Integument POS: nothing reported    NEG: moles that are changing in size, shape, color or rashes   Breast POS: nothing reported    NEG: persistent breast lump, skin dimpling or nipple discharge        Objective   /80   Ht 165.1 cm  "(65\")   Wt 57.7 kg (127 lb 3.2 oz)   LMP  (LMP Unknown) Comment: LMP: Nov 2016  BMI 21.17 kg/m²     General:  well developed; well nourished  no acute distress   Skin:  No suspicious lesions seen   Thyroid: normal to inspection and palpation   Breasts:  Examined in supine position  Symmetric without masses or skin dimpling  Nipples normal without inversion, lesions or discharge  There are no palpable axillary nodes   Abdomen: soft, non-tender; no masses  no umbilical or inguinal hernias are present  no hepato-splenomegaly   Pelvis: Clinical staff was present for exam  External genitalia:  normal appearance of the external genitalia including Bartholin's and Knik-[place]'s glands.  :  urethral meatus normal;  Vaginal:  normal pink mucosa without prolapse or lesions.  Cervix:  normal appearance.  Uterus:  normal size, shape and consistency.  Adnexa:  normal bimanual exam of the adnexa.  Rectal:  digital rectal exam not performed; anus visually normal appearing.        Assessment   1. Normal GYN exam in menopause (age 47)  2. Symptomatic hot flashes, insomnia, depression controlled with HRT  3. She is up to date on all relevant gynecologic and colorectal screenings     Plan   1. Pap was not done today.  I explained to Lucero that the recommendations for Pap smear interval in a low risk patient has lengthened to 3 years time.  I told Lucero she still needs to be seen in our office yearly for a full physical including breast and pelvic exam.  2. She was recommended to begin colonoscopy at age 50 for colon cancer screening   3. DEXA ordered given weight <127# and post menopausal   4. She was encouraged to get yearly mammograms.  She should report any palpable breast lump(s) or skin changes regardless of mammographic findings.  I explained to Lucero that notification regarding her mammogram results will come from the center performing the study.  Our office will not be routinely calling with mammogram results.  It is her " responsibility to make sure that the results from the mammogram are communicated to her by the breast center.  If she has any questions about the results, she is welcome to call our office anytime.  5. Data from the women's health initiative study was reviewed.  With Prempro versus placebo, it was explained that there was no significant difference in the rates of stroke, heart attack or breast cancer until greater than 5 years of use.  The magnitude of risk after 5 years of use was approximately 7 additional cases of breast cancer, heart attack and stroke per 10,000 women years of use.  When the data was reanalyzed including only those women initiating HRT within close proximity of the natural menopause, only the rate of stroke persisted.  Furthermore, data is only available for Prempro.  Any extrapolation to other forms of hormone therapy cannot accurately say whether the risks are equal, less for more than with the medications studied.  Ultimately, if she wishes to use hormone replacement therapy, the goal would be to try to reduce it to the lowest possible dose.  Preferably, the goal would be total withdrawal of systemic hormone therapy by 5 years.  There is no good prospective data to quantify the risk for use of HRT for greater than 6 years.  6. The importance of keeping all planned follow-up and taking all medications as prescribed was emphasized.  7. Follow up for annual exam in one year    No orders of the defined types were placed in this encounter.         This note was electronically signed.    Bonnie Carlson MD  March 12, 2019    Note: Speech recognition transcription software may have been used to create portions of this document.  An attempt at proofreading has been made but errors in transcription could still be present.

## 2019-04-10 ENCOUNTER — HOSPITAL ENCOUNTER (OUTPATIENT)
Dept: BONE DENSITY | Facility: HOSPITAL | Age: 49
Discharge: HOME | End: 2019-04-10
Admitting: OBSTETRICS & GYNECOLOGY

## 2019-04-10 DIAGNOSIS — N95.1 SYMPTOMATIC MENOPAUSAL OR FEMALE CLIMACTERIC STATES: ICD-10-CM

## 2019-04-10 PROCEDURE — 77080 DXA BONE DENSITY AXIAL: CPT

## 2019-04-10 RX ORDER — ESTRADIOL 0.05 MG/D
FILM, EXTENDED RELEASE TRANSDERMAL
Qty: 8 EACH | Refills: 11 | Status: SHIPPED | OUTPATIENT
Start: 2019-04-10 | End: 2020-05-04 | Stop reason: SDUPTHER

## 2019-04-12 ENCOUNTER — TELEPHONE (OUTPATIENT)
Dept: OBSTETRICS AND GYNECOLOGY | Facility: CLINIC | Age: 49
End: 2019-04-12

## 2019-04-12 DIAGNOSIS — M81.0 OSTEOPOROSIS, UNSPECIFIED OSTEOPOROSIS TYPE, UNSPECIFIED PATHOLOGICAL FRACTURE PRESENCE: Primary | ICD-10-CM

## 2019-04-12 PROBLEM — M81.8 OTHER OSTEOPOROSIS WITHOUT CURRENT PATHOLOGICAL FRACTURE: Status: ACTIVE | Noted: 2019-04-12

## 2019-04-12 NOTE — TELEPHONE ENCOUNTER
Called patient to review diagnosis of osteoporosis. We S given she is menopausal and weighs less than 127 pounds.  I reviewed my recommendation for bisphosphonate therapy which she declines.  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  Highly encourage continuing weightbearing exercises.  She is currently on hormone replacement therapy.  Also reviewed on adequate calcium and vitamin D intake.  She request to have her vitamin D level checked to assess for how much she needs to take in.  I agree that this was okay.  Labs placed for vitamin D calcium TSH and free T4.  Will call patient once labs result.    Bonnie Carlson MD

## 2019-05-14 ENCOUNTER — APPOINTMENT (OUTPATIENT)
Dept: MAMMOGRAPHY | Facility: HOSPITAL | Age: 49
End: 2019-05-14
Attending: INTERNAL MEDICINE

## 2019-09-18 ENCOUNTER — TRANSCRIBE ORDERS (OUTPATIENT)
Dept: INTERNAL MEDICINE | Facility: CLINIC | Age: 49
End: 2019-09-18

## 2019-09-18 DIAGNOSIS — R92.8 ABNORMAL MAMMOGRAPHY: Primary | ICD-10-CM

## 2019-12-09 ENCOUNTER — TELEPHONE (OUTPATIENT)
Dept: INTERNAL MEDICINE | Facility: CLINIC | Age: 49
End: 2019-12-09

## 2019-12-11 ENCOUNTER — HOSPITAL ENCOUNTER (OUTPATIENT)
Dept: MAMMOGRAPHY | Facility: HOSPITAL | Age: 49
Discharge: HOME | End: 2019-12-11
Admitting: INTERNAL MEDICINE

## 2019-12-11 DIAGNOSIS — R92.8 ABNORMAL MAMMOGRAPHY: ICD-10-CM

## 2019-12-11 PROCEDURE — 77062 BREAST TOMOSYNTHESIS BI: CPT | Performed by: RADIOLOGY

## 2019-12-11 PROCEDURE — 77066 DX MAMMO INCL CAD BI: CPT

## 2019-12-11 PROCEDURE — 77066 DX MAMMO INCL CAD BI: CPT | Performed by: RADIOLOGY

## 2019-12-11 PROCEDURE — G0279 TOMOSYNTHESIS, MAMMO: HCPCS

## 2019-12-30 ENCOUNTER — OFFICE VISIT (OUTPATIENT)
Dept: FAMILY MEDICINE CLINIC | Facility: CLINIC | Age: 49
End: 2019-12-30

## 2019-12-30 VITALS
DIASTOLIC BLOOD PRESSURE: 86 MMHG | OXYGEN SATURATION: 99 % | HEIGHT: 65 IN | HEART RATE: 56 BPM | WEIGHT: 126 LBS | RESPIRATION RATE: 20 BRPM | SYSTOLIC BLOOD PRESSURE: 122 MMHG | BODY MASS INDEX: 20.99 KG/M2

## 2019-12-30 DIAGNOSIS — Z00.00 HEALTH CARE MAINTENANCE: Primary | ICD-10-CM

## 2019-12-30 DIAGNOSIS — Z12.11 COLON CANCER SCREENING: ICD-10-CM

## 2019-12-30 LAB
BILIRUB BLD-MCNC: NEGATIVE MG/DL
CLARITY, POC: CLEAR
COLOR UR: YELLOW
GLUCOSE UR STRIP-MCNC: NEGATIVE MG/DL
HBA1C MFR BLD: 4.8 %
KETONES UR QL: NEGATIVE
LEUKOCYTE EST, POC: ABNORMAL
NITRITE UR-MCNC: NEGATIVE MG/ML
PH UR: 7 [PH] (ref 5–8)
PROT UR STRIP-MCNC: NEGATIVE MG/DL
RBC # UR STRIP: NEGATIVE /UL
SP GR UR: 1.01 (ref 1–1.03)
UROBILINOGEN UR QL: NORMAL

## 2019-12-30 PROCEDURE — 83525 ASSAY OF INSULIN: CPT | Performed by: NURSE PRACTITIONER

## 2019-12-30 PROCEDURE — 83001 ASSAY OF GONADOTROPIN (FSH): CPT | Performed by: NURSE PRACTITIONER

## 2019-12-30 PROCEDURE — 82607 VITAMIN B-12: CPT | Performed by: NURSE PRACTITIONER

## 2019-12-30 PROCEDURE — 84439 ASSAY OF FREE THYROXINE: CPT | Performed by: NURSE PRACTITIONER

## 2019-12-30 PROCEDURE — 80074 ACUTE HEPATITIS PANEL: CPT | Performed by: NURSE PRACTITIONER

## 2019-12-30 PROCEDURE — 83002 ASSAY OF GONADOTROPIN (LH): CPT | Performed by: NURSE PRACTITIONER

## 2019-12-30 PROCEDURE — 84402 ASSAY OF FREE TESTOSTERONE: CPT | Performed by: NURSE PRACTITIONER

## 2019-12-30 PROCEDURE — 80061 LIPID PANEL: CPT | Performed by: NURSE PRACTITIONER

## 2019-12-30 PROCEDURE — 82627 DEHYDROEPIANDROSTERONE: CPT | Performed by: NURSE PRACTITIONER

## 2019-12-30 PROCEDURE — 86140 C-REACTIVE PROTEIN: CPT | Performed by: NURSE PRACTITIONER

## 2019-12-30 PROCEDURE — 99396 PREV VISIT EST AGE 40-64: CPT | Performed by: NURSE PRACTITIONER

## 2019-12-30 PROCEDURE — 81003 URINALYSIS AUTO W/O SCOPE: CPT | Performed by: NURSE PRACTITIONER

## 2019-12-30 PROCEDURE — 82306 VITAMIN D 25 HYDROXY: CPT | Performed by: NURSE PRACTITIONER

## 2019-12-30 PROCEDURE — 82670 ASSAY OF TOTAL ESTRADIOL: CPT | Performed by: NURSE PRACTITIONER

## 2019-12-30 PROCEDURE — 83036 HEMOGLOBIN GLYCOSYLATED A1C: CPT | Performed by: NURSE PRACTITIONER

## 2019-12-30 PROCEDURE — 84403 ASSAY OF TOTAL TESTOSTERONE: CPT | Performed by: NURSE PRACTITIONER

## 2019-12-30 PROCEDURE — 80050 GENERAL HEALTH PANEL: CPT | Performed by: NURSE PRACTITIONER

## 2019-12-30 PROCEDURE — 86376 MICROSOMAL ANTIBODY EACH: CPT | Performed by: NURSE PRACTITIONER

## 2019-12-30 NOTE — PROGRESS NOTES
Subjective   Lucero Carrion is a 49 y.o. female and is here for a comprehensive physical exam. The patient reports requests for labs for alternative medicine provider.. Patient reports last physical date of 2018.    Patient rates their health as good. Describes diet as Well Balanced Diet. 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 Lives with spouse. Dental exam every 6 months-yes. Brushes teeth twice a day-yes. Vision exam in last 12 months-yes.    Do you take any herbs or supplements that were not prescribed by a doctor? see med list  Are you taking aspirin daily? no  Family history of ovarian cancer? no  Family history of breast cancer? no  FH of endometrial cancer? no  FH of cervical cancer? no  FH of colon cancer? no    Cancer Screening  Mammogram up-to-date?  yes  BMD up-to-date? yes  Colonoscopy up-to-date? no      History:  LMP: No LMP recorded (lmp unknown). Patient is postmenopausal.  Menopause at 48 years  Last pap date: 2-2018  Abnormal pap? no    Immunization History  Tdap? no  HPV? not applicable  Pneumonia? not applicable  Shingles? no    The following portions of the patient's history were reviewed and updated as appropriate: allergies, current medications, past family history, past medical history, past social history, past surgical history and problem list.    Past Medical History:   Diagnosis Date   • Arthritis     RHEUMATOID   • Asthma    • Other osteoporosis without current pathological fracture 4/12/2019       Family History   Problem Relation Age of Onset   • Osteoporosis Mother    • Asthma Father    • Breast cancer Neg Hx    • Ovarian cancer Neg Hx    • Uterine cancer Neg Hx    • Colon cancer Neg Hx        Past Surgical History:   Procedure Laterality Date   • FINGER FRACTURE SURGERY Left    • WISDOM TOOTH EXTRACTION         Social History     Socioeconomic History   • Marital status:      Spouse name: Not on file   • Number of children: Not on file   • Years of  education: Not on file   • Highest education level: Not on file   Occupational History   • Occupation:    Tobacco Use   • Smoking status: Former Smoker     Packs/day: 0.50     Years: 10.00     Pack years: 5.00     Types: Cigarettes     Last attempt to quit: 2009     Years since quittin.0   • Smokeless tobacco: Never Used   Substance and Sexual Activity   • Alcohol use: Yes     Comment: 1 X WEEKLY   • Drug use: No   • Sexual activity: Yes     Partners: Male     Birth control/protection: Post-menopausal       Review of Systems  Do you have pain that bothers you in your daily life? no  Review of Systems   Constitutional: Negative for fatigue, fever and unexpected weight change.   HENT: Negative for congestion, hearing loss, nosebleeds, rhinorrhea, sore throat, trouble swallowing and voice change.    Eyes: Negative for pain, discharge, redness and visual disturbance.   Respiratory: Negative for cough, chest tightness, shortness of breath and wheezing.    Cardiovascular: Negative for chest pain, palpitations and leg swelling.   Gastrointestinal: Negative for abdominal distention, abdominal pain, anal bleeding, blood in stool, constipation, diarrhea, nausea and vomiting.   Endocrine: Negative for cold intolerance, heat intolerance, polydipsia, polyphagia and polyuria.   Genitourinary: Negative for dysuria, flank pain, frequency and hematuria.   Musculoskeletal: Negative for arthralgias, gait problem, joint swelling and myalgias.   Skin: Negative for color change and rash.   Neurological: Negative for dizziness, tremors, seizures, syncope, speech difficulty, weakness, numbness and headaches.   Hematological: Negative.    Psychiatric/Behavioral: Negative.        Objective   Physical Exam   Constitutional: She is oriented to person, place, and time. She appears well-developed and well-nourished. No distress.   HENT:   Head: Normocephalic and atraumatic.   Right Ear: Tympanic membrane and external ear  normal.   Left Ear: Tympanic membrane and external ear normal.   Nose: Nose normal.   Mouth/Throat: Oropharynx is clear and moist. No oropharyngeal exudate.   Eyes: Pupils are equal, round, and reactive to light. Conjunctivae are normal. Right eye exhibits no discharge. Left eye exhibits no discharge. No scleral icterus.   Neck: Neck supple. No tracheal deviation present. No thyromegaly present.   Cardiovascular: Normal rate, regular rhythm and normal heart sounds. Exam reveals no gallop and no friction rub.   No murmur heard.  Pulmonary/Chest: Effort normal and breath sounds normal. No respiratory distress. She has no wheezes.   Abdominal: Soft. Bowel sounds are normal. She exhibits no distension and no mass. There is no tenderness.   Musculoskeletal: She exhibits no edema or deformity.   Lymphadenopathy:     She has no cervical adenopathy.   Neurological: She is alert and oriented to person, place, and time. Coordination normal.   Skin: Skin is warm and dry. Capillary refill takes less than 2 seconds. No rash noted. No erythema.   Psychiatric: She has a normal mood and affect. Her speech is normal and behavior is normal. Judgment and thought content normal.   Nursing note and vitals reviewed.    Lucero was seen today for annual exam.    Diagnoses and all orders for this visit:    Health care maintenance  -     POC Glycosylated Hemoglobin (Hb A1C)  -     POC Urinalysis Dipstick, Automated  -     CBC & Differential  -     Comprehensive Metabolic Panel  -     Lipid Panel  -     T4, Free  -     TSH  -     Vitamin D 25 Hydroxy  -     Follicle Stimulating Hormone  -     Luteinizing Hormone  -     Thyroid Peroxidase Antibody  -     Hepatitis Panel, Acute  -     Vitamin B12  -     Testosterone, Free, Total  -     Estradiol  -     DHEA-Sulfate  -     Insulin, Total  -     C-reactive Protein  -     CBC Auto Differential  -     Hepatitis Panel, Acute  -     Hep B Confirmation Tube    Colon cancer screening  -     Ambulatory  Referral to Colorectal Surgery        1. Will order labs she requests today.  2. Patient Counseling:  --Nutrition: Stressed importance of moderation in sodium/caffeine intake, saturated fat and cholesterol, caloric balance, sufficient intake of fresh fruits, vegetables, fiber, calcium, iron, and 1 mg of folate supplement per day (for females capable of pregnancy).  --Exercise: Stressed the importance of exercise 5 times a week  --Injury prevention: Discussed safety belts, safety helmets, smoke detector, smoking near bedding or upholstery.   --Dental health: Discussed importance of regular tooth brushing, flossing, and dental visits.  --Immunizations reviewed.  --Discussed benefits of screening colonoscopy.  --Discussed benefits of screening mammogram.  --After hours service discussed with patient, and appropriate use of the ER.  --Discussed the importance of medication compliance, follow up with me and other health care providers, annual physical, fasting labs, and age appropriate screenings.    3. Discussed the patient's BMI with her.  The BMI is in the acceptable range  4. Follow up in one year  5. Discussed the nature of the disease including, risks, complications, implications, management, safe and proper use of medications. Encouraged therapeutic lifestyle changes including low calorie diet with plenty of fruits and vegetables, daily exercise, medication compliance, and keeping scheduled follow up appointments with me and any other providers. Encouraged patient to have appointment for complete physical, fasting labs, appropriate screenings, and immunizations on an annual basis.

## 2019-12-31 ENCOUNTER — TELEPHONE (OUTPATIENT)
Dept: FAMILY MEDICINE CLINIC | Facility: CLINIC | Age: 49
End: 2019-12-31

## 2019-12-31 LAB
25(OH)D3 SERPL-MCNC: 82 NG/ML (ref 30–100)
ALBUMIN SERPL-MCNC: 5.1 G/DL (ref 3.5–5.2)
ALBUMIN/GLOB SERPL: 1.4 G/DL
ALP SERPL-CCNC: 80 U/L (ref 39–117)
ALT SERPL W P-5'-P-CCNC: 29 U/L (ref 1–33)
ANION GAP SERPL CALCULATED.3IONS-SCNC: 14.9 MMOL/L (ref 5–15)
AST SERPL-CCNC: 36 U/L (ref 1–32)
BASOPHILS # BLD AUTO: 0.02 10*3/MM3 (ref 0–0.2)
BASOPHILS NFR BLD AUTO: 0.5 % (ref 0–1.5)
BILIRUB SERPL-MCNC: 0.4 MG/DL (ref 0.2–1.2)
BUN BLD-MCNC: 9 MG/DL (ref 6–20)
BUN/CREAT SERPL: 11.8 (ref 7–25)
CALCIUM SPEC-SCNC: 9.9 MG/DL (ref 8.6–10.5)
CHLORIDE SERPL-SCNC: 102 MMOL/L (ref 98–107)
CHOLEST SERPL-MCNC: 266 MG/DL (ref 0–200)
CO2 SERPL-SCNC: 23.1 MMOL/L (ref 22–29)
CREAT BLD-MCNC: 0.76 MG/DL (ref 0.57–1)
CRP SERPL-MCNC: <0.03 MG/DL (ref 0–0.5)
DEPRECATED RDW RBC AUTO: 37.7 FL (ref 37–54)
EOSINOPHIL # BLD AUTO: 0.07 10*3/MM3 (ref 0–0.4)
EOSINOPHIL NFR BLD AUTO: 1.7 % (ref 0.3–6.2)
ERYTHROCYTE [DISTWIDTH] IN BLOOD BY AUTOMATED COUNT: 12.1 % (ref 12.3–15.4)
ESTRADIOL SERPL HS-MCNC: 194 PG/ML
FSH SERPL-ACNC: 56.4 MIU/ML
GFR SERPL CREATININE-BSD FRML MDRD: 81 ML/MIN/1.73
GLOBULIN UR ELPH-MCNC: 3.6 GM/DL
GLUCOSE BLD-MCNC: 103 MG/DL (ref 65–99)
HAV IGM SERPL QL IA: ABNORMAL
HBV CORE IGM SERPL QL IA: ABNORMAL
HBV SURFACE AG SERPL QL IA: ABNORMAL
HCT VFR BLD AUTO: 47.8 % (ref 34–46.6)
HCV AB SER DONR QL: REACTIVE
HDLC SERPL-MCNC: 135 MG/DL (ref 40–60)
HGB BLD-MCNC: 16.8 G/DL (ref 12–15.9)
HOLD SPECIMEN: NORMAL
IMM GRANULOCYTES # BLD AUTO: 0.01 10*3/MM3 (ref 0–0.05)
IMM GRANULOCYTES NFR BLD AUTO: 0.2 % (ref 0–0.5)
LDLC SERPL CALC-MCNC: 116 MG/DL (ref 0–100)
LDLC/HDLC SERPL: 0.86 {RATIO}
LH SERPL-ACNC: 43 MIU/ML
LYMPHOCYTES # BLD AUTO: 1.41 10*3/MM3 (ref 0.7–3.1)
LYMPHOCYTES NFR BLD AUTO: 33.7 % (ref 19.6–45.3)
MCH RBC QN AUTO: 30.4 PG (ref 26.6–33)
MCHC RBC AUTO-ENTMCNC: 35.1 G/DL (ref 31.5–35.7)
MCV RBC AUTO: 86.6 FL (ref 79–97)
MONOCYTES # BLD AUTO: 0.32 10*3/MM3 (ref 0.1–0.9)
MONOCYTES NFR BLD AUTO: 7.6 % (ref 5–12)
NEUTROPHILS # BLD AUTO: 2.36 10*3/MM3 (ref 1.7–7)
NEUTROPHILS NFR BLD AUTO: 56.3 % (ref 42.7–76)
NRBC BLD AUTO-RTO: 0 /100 WBC (ref 0–0.2)
PLATELET # BLD AUTO: 272 10*3/MM3 (ref 140–450)
PMV BLD AUTO: 10.3 FL (ref 6–12)
POTASSIUM BLD-SCNC: 4.3 MMOL/L (ref 3.5–5.2)
PROT SERPL-MCNC: 8.7 G/DL (ref 6–8.5)
RBC # BLD AUTO: 5.52 10*6/MM3 (ref 3.77–5.28)
SODIUM BLD-SCNC: 140 MMOL/L (ref 136–145)
T4 FREE SERPL-MCNC: 1.33 NG/DL (ref 0.93–1.7)
TRIGL SERPL-MCNC: 77 MG/DL (ref 0–150)
TSH SERPL DL<=0.05 MIU/L-ACNC: 1.95 UIU/ML (ref 0.27–4.2)
VIT B12 BLD-MCNC: 844 PG/ML (ref 211–946)
VLDLC SERPL-MCNC: 15.4 MG/DL (ref 5–40)
WBC NRBC COR # BLD: 4.19 10*3/MM3 (ref 3.4–10.8)

## 2020-01-01 LAB
DHEA-S SERPL-MCNC: 92.8 UG/DL (ref 41.2–243.7)
THYROPEROXIDASE AB SERPL-ACNC: <6 IU/ML (ref 0–34)

## 2020-01-02 LAB — INSULIN SERPL-ACNC: 10 UIU/ML (ref 2.6–24.9)

## 2020-01-03 ENCOUNTER — TELEPHONE (OUTPATIENT)
Dept: FAMILY MEDICINE CLINIC | Facility: CLINIC | Age: 50
End: 2020-01-03

## 2020-01-03 DIAGNOSIS — M05.721 RHEUMATOID ARTHRITIS INVOLVING RIGHT ELBOW WITH POSITIVE RHEUMATOID FACTOR (HCC): ICD-10-CM

## 2020-01-03 DIAGNOSIS — R76.8 POSITIVE HEPATITIS C ANTIBODY TEST: Primary | ICD-10-CM

## 2020-01-03 LAB
TESTOST FREE SERPL-MCNC: 1.6 PG/ML (ref 0–4.2)
TESTOST SERPL-MCNC: 20 NG/DL (ref 3–41)

## 2020-03-07 NOTE — PROGRESS NOTES
[facility] [place]    Subjective     Chief Complaint   Patient presents with   • Right Elbow - Pain     Right Elbow pain started 9 months ago        HPI      Lucero Carrion is a 47 y.o. female.  She has a 9 month history of right elbow pain.  It is daily.  It is better with ice.  She tried ibuprofen.  Her pain is 5-6 out of 10.  The pain is aching burning stabbing.  He tried a brace.    Past Medical History:   Diagnosis Date   • Arthritis     RHEUMATOID   • Asthma       Past Surgical History:   Procedure Laterality Date   • FINGER FRACTURE SURGERY        Family History   Problem Relation Age of Onset   • Osteoporosis Mother    • Asthma Father    • Breast cancer Neg Hx    • Ovarian cancer Neg Hx    • BRCA 1/2 Neg Hx      Social History     Social History   • Marital status:      Spouse name: N/A   • Number of children: N/A   • Years of education: N/A     Occupational History   •       Social History Main Topics   • Smoking status: Former Smoker     Packs/day: 0.50     Years: 10.00     Types: Cigarettes     Quit date: 2009   • Smokeless tobacco: Never Used   • Alcohol use Yes      Comment: 1 X WEEKLY   • Drug use: No   • Sexual activity: Defer     Other Topics Concern   • Not on file     Social History Narrative      Current Outpatient Prescriptions on File Prior to Visit   Medication Sig Dispense Refill   • albuterol (PROVENTIL HFA;VENTOLIN HFA) 108 (90 BASE) MCG/ACT inhaler Inhale 2 puffs As Needed for wheezing.     • ibuprofen (ADVIL,MOTRIN) 200 MG tablet Take 200 mg by mouth Every 6 (Six) Hours As Needed for Mild Pain .     • magnesium chloride ER (MAG-DELAY) 535 (64 Mg) MG CR tablet Take 64 mg by mouth Daily.     • naproxen (NAPROSYN) 500 MG tablet Take 1 tablet by mouth 2 (Two) Times a Day With Meals. 60 tablet 2   • NON FORMULARY Tumeric, L[place]in       No current facility-administered medications on file prior to visit.       No Known Allergies     The following portions of  "the patient's history were reviewed and updated as appropriate: allergies, current medications, past family history, past medical history, past social history, past surgical history and problem list.    Review of Systems   Constitutional: Negative.    HENT: Negative.    Eyes: Negative.    Respiratory: Negative.    Cardiovascular: Negative.    Gastrointestinal: Negative.    Endocrine: Negative.    Genitourinary: Negative.    Musculoskeletal: Positive for arthralgias.   Skin: Negative.    Allergic/Immunologic: Negative.    Neurological: Positive for numbness.   Hematological: Negative.    Psychiatric/Behavioral: Negative.         Objective      Physical Exam  /85  Pulse 58  Ht 65\" (165.1 cm)  Wt 126 lb 3.2 oz (57.2 kg)  BMI 21 kg/m2    Body mass index is 21 kg/(m^2).        GENERAL APPEARANCE: awake, alert & oriented x 3, in no acute distress and well developed, well nourished  PSYCH: normal mood andaffect  LUNGS:  breathing nonlabored, no wheezing  EYES: sclera anicteric, pupils equal  CARDIOVASCULAR: palpable pulses dorsalis pedis, palpable posterior tibial bilaterally. Capillary refill less than 2 seconds  INTEGUMENTARY: skin intact, no clubbing, cyanosis  NEUROLOGIC:  Normal gait and balance            Ortho Exam  Peripheral Vascular   Right Upper Extremity    No cyanotic nail beds    Pink nail beds and rapid capillary refill   Palpation    Radial Pulse - Bilaterally normal    Neurologic   Sensory - Elbow   Inspection and Palpation:    Light touch: intact - right hand   Muscle Strength and Tone:    Right bicep - 5/5    Right tricep - 5/5    Right wrist extensors - 5/5     Right wrist flexors - 5/5    Right intrinsics - 5/5    Musculoskeletal   Right Upper Extremity - Elbow   Inspection and Palpation     Tenderness - none    Effusion - none    Crepitus - none   Range of Motion    Flexion: AROM - 145 degrees    Extension - AROM - 0 degrees     Forearm supination: AROM - 90 degrees    Forearm pronation - " AROM - 90 degrees   Instability    Right - tennis elbow test positive   Deformities/Malalignments/Discepancies - non   Functional testing:    Tinel's sign negative    Valgus stress test negative    Varus stress test negative    Imaging/Studies  Imaging Results (last 24 hours)     ** No results found for the last 24 hours. **      I reviewed x-rays from sit to her 20th with her negative right elbow    Assessment/Plan      Lucero was seen today for pain.    Diagnoses and all orders for this visit:    Tendinitis of elbow      I gave her a cortisone injection today the right elbow she tolerated the injection well without complication she'll follow up in 3 weeks.  We discussed rest    Medical Decision Making  Management Options : prescription/IM medicine  Data/Risk: radiology tests and independent visualization of imaging, lab tests, or EMG/NCV    Freddie Reyes MD  10/04/17  2:06 PM             negative...

## 2020-05-04 RX ORDER — ESTRADIOL 0.05 MG/D
1 FILM, EXTENDED RELEASE TRANSDERMAL 2 TIMES WEEKLY
Qty: 8 EACH | Refills: 4 | Status: SHIPPED | OUTPATIENT
Start: 2020-05-04 | End: 2020-12-30

## 2020-12-30 RX ORDER — ESTRADIOL 0.05 MG/D
FILM, EXTENDED RELEASE TRANSDERMAL
Qty: 8 EACH | Refills: 3 | Status: SHIPPED | OUTPATIENT
Start: 2020-12-30 | End: 2021-05-26

## 2021-04-08 ENCOUNTER — IMMUNIZATION (OUTPATIENT)
Dept: VACCINE CLINIC | Facility: HOSPITAL | Age: 51
End: 2021-04-08

## 2021-04-08 PROCEDURE — 91300 HC SARSCOV02 VAC 30MCG/0.3ML IM: CPT | Performed by: INTERNAL MEDICINE

## 2021-04-08 PROCEDURE — 0001A: CPT | Performed by: INTERNAL MEDICINE

## 2021-04-29 ENCOUNTER — IMMUNIZATION (OUTPATIENT)
Dept: VACCINE CLINIC | Facility: HOSPITAL | Age: 51
End: 2021-04-29

## 2021-04-29 PROCEDURE — 91300 HC SARSCOV02 VAC 30MCG/0.3ML IM: CPT | Performed by: INTERNAL MEDICINE

## 2021-04-29 PROCEDURE — 0002A: CPT | Performed by: INTERNAL MEDICINE

## 2021-05-26 RX ORDER — ESTRADIOL 0.05 MG/D
FILM, EXTENDED RELEASE TRANSDERMAL
Qty: 8 EACH | Refills: 2 | Status: SHIPPED | OUTPATIENT
Start: 2021-05-26 | End: 2021-07-22 | Stop reason: SDUPTHER

## 2021-05-26 RX ORDER — PROGESTERONE 100 MG/1
CAPSULE ORAL
Qty: 30 CAPSULE | Refills: 2 | Status: SHIPPED | OUTPATIENT
Start: 2021-05-26 | End: 2021-07-22 | Stop reason: SDUPTHER

## 2021-07-22 ENCOUNTER — OFFICE VISIT (OUTPATIENT)
Dept: OBSTETRICS AND GYNECOLOGY | Facility: CLINIC | Age: 51
End: 2021-07-22

## 2021-07-22 VITALS
WEIGHT: 124.6 LBS | SYSTOLIC BLOOD PRESSURE: 132 MMHG | DIASTOLIC BLOOD PRESSURE: 80 MMHG | HEIGHT: 65 IN | BODY MASS INDEX: 20.76 KG/M2

## 2021-07-22 DIAGNOSIS — Z12.31 SCREENING MAMMOGRAM, ENCOUNTER FOR: ICD-10-CM

## 2021-07-22 DIAGNOSIS — N95.1 SYMPTOMATIC MENOPAUSAL OR FEMALE CLIMACTERIC STATES: ICD-10-CM

## 2021-07-22 DIAGNOSIS — M81.8 OTHER OSTEOPOROSIS WITHOUT CURRENT PATHOLOGICAL FRACTURE: ICD-10-CM

## 2021-07-22 DIAGNOSIS — Z01.419 ENCNTR FOR GYN EXAM (GENERAL) (ROUTINE) W/O ABN FINDINGS: Primary | ICD-10-CM

## 2021-07-22 DIAGNOSIS — Z12.11 SCREEN FOR COLON CANCER: ICD-10-CM

## 2021-07-22 PROCEDURE — 99396 PREV VISIT EST AGE 40-64: CPT | Performed by: OBSTETRICS & GYNECOLOGY

## 2021-07-22 RX ORDER — ESTRADIOL 0.05 MG/D
1 FILM, EXTENDED RELEASE TRANSDERMAL 2 TIMES WEEKLY
Qty: 8 EACH | Refills: 11 | Status: SHIPPED | OUTPATIENT
Start: 2021-07-22 | End: 2022-07-25 | Stop reason: SDUPTHER

## 2021-07-22 RX ORDER — PROGESTERONE 100 MG/1
100 CAPSULE ORAL DAILY
Qty: 30 CAPSULE | Refills: 11 | Status: SHIPPED | OUTPATIENT
Start: 2021-07-22 | End: 2022-07-25 | Stop reason: SDUPTHER

## 2021-07-22 NOTE — PROGRESS NOTES
"Subjective   Chief Complaint   Patient presents with   • Gynecologic Exam     BEREKET pt; annual; no c/o     Lucero Carrion is a 51 y.o. year old  menopausal female presenting to be seen for her annual exam.  This past year she has been on hormone replacement therapy.  There has not been vaginal bleeding in the last 12 months.  Menopausal symptoms are present (hot flashes) but not affecting her quality of life .    SEXUAL Hx:  She is currently sexually active.  In the past year there there has been NO new sexual partners.    Condoms are never used.  She would not like to be screened for STD's at today's exam.  [place] is painful: no  HEALTH Hx:  She exercises regularly: yes.  She wears her seat belt: yes.  She has concerns about domestic violence: no.  OTHER THINGS SHE WANTS TO DISCUSS TODAY:  Nothing else    The following portions of the patient's history were reviewed and updated as appropriate:problem list, current medications, allergies, past family history, past medical history, past social history and past surgical history.    Social History    Tobacco Use      Smoking status: Former Smoker        Packs/day: 0.50        Years: 10.00        Pack years: 5        Types: Cigarettes        Quit date:         Years since quittin.5      Smokeless tobacco: Never Used      Review of Systems  Constitutional POS: nothing reported    NEG: anorexia or night sweats   Genitourinary POS: nothing reported    NEG: dysuria or hematuria      Gastointestinal POS: nothing reported    NEG: bloating, change in bowel habits, melena or reflux symptoms   Integument POS: nothing reported    NEG: moles that are changing in size, shape, color or rashes   Breast POS: nothing reported    NEG: persistent breast lump, skin dimpling or nipple discharge        Objective   /80   Ht 165.1 cm (65\")   Wt 56.5 kg (124 lb 9.6 oz)   LMP  (LMP Unknown) Comment: LMP: 2016  Breastfeeding No   BMI 20.73 kg/m²     General:  " well developed; well nourished  no acute distress   Skin:  No suspicious lesions seen   Thyroid: normal to inspection and palpation   Breasts:  Examined in supine position  Symmetric without masses or skin dimpling  Nipples normal without inversion, lesions or discharge  There are no palpable axillary nodes   Abdomen: soft, non-tender; no masses  no umbilical or inguinal hernias are present  no hepato-splenomegaly   Pelvis: Clinical staff was present for exam  External genitalia:  normal appearance of the external genitalia including Bartholin's and Wenden's glands.  :  urethral meatus normal;  Vaginal:  atrophic mucosal changes are present;  Cervix:  normal appearance.  Uterus:  normal size, shape and consistency.  Adnexa:  non palpable bilaterally.        Assessment   1. Normal GYN exam in postmenopausal female  2. Symptomatic Menopause with HRT -- well controlled on HRT  3. Osteoporosis  4. She is up to date on all relevant gynecologic and colorectal screenings except PAP test, mammography and colon cancer screening     Plan   Pap and HPV were done today.  If she does not receive the results of the Pap within 2 weeks  time, she was instructed to call to find out the results.  I explained to Lucero that the recommendations for Pap smear interval in a low risk patient's has lengthened to 5 years time if both cytology and HPV testing were normal.  I encouraged her to be seen yearly for a full physical exam including breast and pelvic exam even during the off years when PAP's will not be performed.  She was encouraged to get yearly mammograms.  She should report any palpable breast lump(s) or skin changes regardless of mammographic findings.  I explained to Lucero that notification regarding her mammogram results will come from the center performing the study.  Our office will not be routinely calling with mammogram results.  It is her responsibility to make sure that the results from the mammogram are communicated to  her by the breast center.  If she has any questions about the results, she is welcome to call our office anytime.  Colonoscopy was recommended for screening for colon cancer.  The procedure was briefly discussed and its benefits for early detection of colon cancer were emphasized.  I explained to Lucero that we could help her to schedule it if she wishes.  Additionally, she could also contact her primary care physician to help make this arrangement.  After considering these options she wants help setting up her colonoscopy.  Referral will be made for outpatient colonoscopy.  1. Patient declines treatment of osteoporosis with recommended bisphosphonate therapy. Encouraged to continue adequate calcium and vitamin D intake and weight bearing exercise. Recommend repeat DEXA in 2022.  2. Refills for HRT sent to pharmacy. Patient counseled to call with any vaginal bleeding.   3. The importance of keeping all planned follow-up and taking all medications as prescribed was emphasized.  4. Follow up for annual exam in 1 year.     New Medications Ordered This Visit   Medications   • Progesterone (PROMETRIUM) 100 MG capsule     Sig: Take 1 capsule by mouth Daily.     Dispense:  30 capsule     Refill:  11   • estradiol (MINIVELLE, VIVELLE-DOT) 0.05 MG/24HR patch     Sig: Place 1 patch on the skin as directed by provider 2 (Two) Times a Week.     Dispense:  8 each     Refill:  11          This note was electronically signed.    Gaye Le DO   July 22, 2021    Note: Speech recognition transcription software may have been used to create portions of this document.  An attempt at proofreading has been made but errors in transcription could still be present.

## 2021-09-08 ENCOUNTER — HOSPITAL ENCOUNTER (OUTPATIENT)
Dept: MAMMOGRAPHY | Facility: HOSPITAL | Age: 51
Discharge: HOME | End: 2021-09-08
Admitting: OBSTETRICS & GYNECOLOGY

## 2021-09-08 DIAGNOSIS — Z12.31 SCREENING MAMMOGRAM, ENCOUNTER FOR: ICD-10-CM

## 2021-09-08 PROCEDURE — 77067 SCR MAMMO BI INCL CAD: CPT

## 2021-09-08 PROCEDURE — 77063 BREAST TOMOSYNTHESIS BI: CPT

## 2022-06-01 ENCOUNTER — OFFICE VISIT (OUTPATIENT)
Dept: INTERNAL MEDICINE | Facility: CLINIC | Age: 52
End: 2022-06-01

## 2022-06-01 VITALS
BODY MASS INDEX: 20.24 KG/M2 | SYSTOLIC BLOOD PRESSURE: 130 MMHG | DIASTOLIC BLOOD PRESSURE: 64 MMHG | HEIGHT: 65 IN | HEART RATE: 72 BPM | RESPIRATION RATE: 20 BRPM | WEIGHT: 121.5 LBS | TEMPERATURE: 98.4 F

## 2022-06-01 DIAGNOSIS — M81.8 OTHER OSTEOPOROSIS WITHOUT CURRENT PATHOLOGICAL FRACTURE: ICD-10-CM

## 2022-06-01 DIAGNOSIS — B19.20 HEPATITIS C VIRUS INFECTION WITHOUT HEPATIC COMA, UNSPECIFIED CHRONICITY: ICD-10-CM

## 2022-06-01 DIAGNOSIS — M05.9 RHEUMATOID ARTHRITIS WITH POSITIVE RHEUMATOID FACTOR, INVOLVING UNSPECIFIED SITE: ICD-10-CM

## 2022-06-01 DIAGNOSIS — Z13.29 THYROID DISORDER SCREEN: ICD-10-CM

## 2022-06-01 DIAGNOSIS — J45.20 MILD INTERMITTENT ASTHMA WITHOUT COMPLICATION: ICD-10-CM

## 2022-06-01 DIAGNOSIS — Z12.11 COLON CANCER SCREENING: ICD-10-CM

## 2022-06-01 DIAGNOSIS — Z13.1 ENCOUNTER FOR SCREENING EXAMINATION FOR IMPAIRED GLUCOSE REGULATION AND DIABETES MELLITUS: ICD-10-CM

## 2022-06-01 DIAGNOSIS — R10.11 RUQ PAIN: Primary | ICD-10-CM

## 2022-06-01 DIAGNOSIS — Z13.220 LIPID SCREENING: ICD-10-CM

## 2022-06-01 PROCEDURE — 99214 OFFICE O/P EST MOD 30 MIN: CPT | Performed by: NURSE PRACTITIONER

## 2022-06-01 NOTE — PROGRESS NOTES
Chief Complaint  Fatigue (Burning feeling Rt side stomach ) and Abnormal Lab (Positive Hep C )    Subjective          Lucero Carrion presents to [facility]  History of Present Illness    Patient is a 52-year-old female who presents today with complaints of pain under right rib. She also would like to discuss an abnormal lab of positive hepatitis C.    The patient's blood pressure is normal. Her weight is down 3 pounds since her last visit. The patient has no abdominal surgical history. I have seen the patient in the past, but it has been over 3 years ago. She was seen by gynecology in 2019, but she has not seen any other providers since.    She has a history of rheumatoid arthritis, but she no longer sees a rheumatologist. The patient notes being on medication for over 10 years. She deferred being referred to rheumatology at this time as her symptoms are currently stable.    The patient has a history of osteoporosis as of 2019 and there is no DEXA scan on file.    The patient has a history of asthma since a child. She notes that she has been using her inhalers more frequently during this season. The patient has occasional dyspnea due to asthma.     She was diagnosed with hepatitis C in 12/2019.    The patient has not tried to treat the right upper quadrant pain and it is not connected to eating. The pain starts when she gets out of bed. She does not report nausea, vomiting, change in appetite, or weight loss. The patient does not have acid reflux, so she had not tried an over the counter antacid. She has a high fat, low carb diet. The patient has normal bowel movements daily.    The patient has not had a colonoscopy. There is no known family history of colon cancer.    No had dizziness fever sweats chills no runny stuffy nose no cough no Cp SOB no NVD no rash and no new lump bumps + abd pain    Objective   Vital Signs:   /64 (BP Location: Right arm)   Pulse 72    "Temp 98.4 °F (36.9 °C) (Temporal)   Resp 20   Ht 165.1 cm (65\")   Wt 55.1 kg (121 lb 8 oz)   BMI 20.22 kg/m²     Physical Exam  Vitals and nursing note reviewed.   Constitutional:       Appearance: She is well-developed.   HENT:      Head: Normocephalic and atraumatic.      Right Ear: External ear normal.      Left Ear: External ear normal.      Nose: Nose normal.   Neck:      Thyroid: No thyromegaly.   Cardiovascular:      Rate and Rhythm: Normal rate and regular rhythm.   Pulmonary:      Effort: Pulmonary effort is normal.      Breath sounds: Normal breath sounds.   Abdominal:      General: Bowel sounds are normal. There is no distension.      Palpations: Abdomen is soft.      Tenderness: Tenderness: ruq.   Lymphadenopathy:      Cervical: No cervical adenopathy.   Skin:     Capillary Refill: Capillary refill takes 2 to 3 seconds.   Neurological:      Mental Status: She is alert and oriented to person, place, and time.   Psychiatric:         Behavior: Behavior normal.        Result Review :                 Assessment and Plan    Diagnoses and all orders for this visit:    1. RUQ pain (Primary)  -     US Abdomen Limited; Future    2. Hepatitis C virus infection without hepatic coma, unspecified chronicity  -     Ambulatory Referral to Gastroenterology  -     Hepatitis C RNA, Quantitative, PCR (graph); Future  -     US Abdomen Limited; Future      Hepatitis C  - I have referred the patient to gastroenterology for further evaluation. Hepatitis C RNA ordered.    Right upper quadrant pain  - I have ordered a right upper quadrant ultrasound.   - We have discussed GERD possibilities and the way we assess is by treatment versus endoscopy.   - We will hold off on treatment for possible GERD per the patient's request.         Discussed the patient's BMI with her. BMI is above normal parameters. Recommendations include: educational material.    AWV: na  A1C:   Lab Results   Component Value Date    HGBA1C 4.8 12/30/2019    "   ACP: Advance Care Planning   ACP discussion was held with the patient during this visit. Patient does not have an advance directive, information provided.   Mammogram: 9/21 Ordered cologard      Follow Up   Return in about 1 month (around 7/1/2022) for fasting, Next scheduled follow up.  Patient was given instructions and counseling regarding her condition or for health maintenance advice. Please see specific information pulled into the AVS if appropriate.     RTC/call  If symptoms worsen  Meds MOA and SE's reviewed and pt v/u    Transcribed from ambient dictation for WENDY Cr by Kelli Calle.  06/01/22   18:13 EDT    Patient verbalized consent to the visit recording.

## 2022-06-01 NOTE — PATIENT INSTRUCTIONS
MyPlate from USDA    MyPlate is an outline of a general healthy diet based on the 2010 Dietary Guidelines for Americans, from the U.S. Department of Agriculture (USDA). It sets guidelines for how To follow MyPlate recommendations:  Eat a wide variety of fruits and vegetables, grains, and protein foods.  Serve smaller portions and eat less food throughout the day.  Limit portion sizes to avoid overeating.  Enjoy your food.  Get at least 150 minutes of exercise every week. This is about 30 minutes each day, 5 or more days per week.  It can be difficult to have every meal look like MyPlate. Think about MyPlate as eating guidelines for an entire day, rather than each individual meal.  Fruits and vegetables  Make half of your plate fruits and vegetables.  Eat many different colors of fruits and vegetables each day.  For a 2,000 calorie daily food plan, eat:  2½ cups of vegetables every day.  2 cups of fruit every day.  1 cup is equal to:  1 cup raw or cooked vegetables.  1 cup raw fruit.  1 medium-sized orange, apple, or banana.  1 cup 100% fruit or vegetable juice.  2 cups raw leafy greens, such as lettuce, spinach, or kale.  ½ cup dried fruit.  Grains  One fourth of your plate should be grains.  Make at least half of the grains you eat each day whole grains.  For a 2,000 calorie daily food plan, eat 6 oz of grains every day.  1 oz is equal to:  1 slice bread.  1 cup cereal.  ½ cup cooked rice, cereal, or pasta.  Protein  One fourth of your plate should be protein.  Eat a wide variety of protein foods, including meat, poultry, fish, eggs, beans, nuts, and tofu.  For a 2,000 calorie daily food plan, eat 5½ oz of protein every day.  1 oz is equal to:  1 oz meat, poultry, or fish.  ¼ cup cooked beans.  1 egg.  ½ oz nuts or seeds.  1 Tbsp peanut butter.  Dairy  Drink fat-free or low-fat (1%) milk.  Eat or drink dairy as a side to meals.  For a 2,000 calorie daily food plan, eat or drink 3 cups of dairy every day.  1 cup is  equal to:  1 cup milk, yogurt, cottage cheese, or soy milk (soy beverage).  2 oz processed cheese.  1½ oz natural cheese.  Fats, oils, salt, and sugars  Only small amounts of oils are recommended.  Avoid foods that are high in calories and low in nutritional value (empty calories), like foods high in fat or added sugars.  Choose foods that are low in salt (sodium). Choose foods that have less than 140 milligrams (mg) of sodium per serving.  Drink water instead of sugary drinks. Drink enough water each day to keep your urine pale yellow.  Where to find support  Work with your health care provider or a nutrition specialist (dietitian) to develop a customized eating plan that is right for you.  Download an randy (mobile application) to help you track your daily food intake.  Where to find more information  Go to ChooseMyPlate.gov for more information.  Summary  MyPlate is a general guideline for healthy eating from the USDA. It is based on the 2010 Dietary Guidelines for Americans.  In general, fruits and vegetables should take up ½ of your plate, grains should take up ¼ of your plate, and protein should take up ¼ of your plate.  This information is not intended to replace advice given to you by your health care provider. Make sure you discuss any questions you have with your health care provider.  Document Revised: 05/21/2020 Document Reviewed: 03/19/2018  Elsevier Patient Education © 2021 [employer]

## 2022-06-02 ENCOUNTER — LAB (OUTPATIENT)
Dept: LAB | Facility: HOSPITAL | Age: 52
End: 2022-06-02

## 2022-06-02 ENCOUNTER — LAB (OUTPATIENT)
Dept: INTERNAL MEDICINE | Facility: CLINIC | Age: 52
End: 2022-06-02

## 2022-06-02 DIAGNOSIS — Z12.11 COLON CANCER SCREENING: ICD-10-CM

## 2022-06-02 DIAGNOSIS — Z13.29 THYROID DISORDER SCREEN: ICD-10-CM

## 2022-06-02 DIAGNOSIS — Z13.1 ENCOUNTER FOR SCREENING EXAMINATION FOR IMPAIRED GLUCOSE REGULATION AND DIABETES MELLITUS: ICD-10-CM

## 2022-06-02 DIAGNOSIS — Z13.220 LIPID SCREENING: ICD-10-CM

## 2022-06-02 DIAGNOSIS — J45.20 MILD INTERMITTENT ASTHMA WITHOUT COMPLICATION: ICD-10-CM

## 2022-06-02 DIAGNOSIS — B19.20 HEPATITIS C VIRUS INFECTION WITHOUT HEPATIC COMA, UNSPECIFIED CHRONICITY: ICD-10-CM

## 2022-06-02 DIAGNOSIS — M81.8 OTHER OSTEOPOROSIS WITHOUT CURRENT PATHOLOGICAL FRACTURE: ICD-10-CM

## 2022-06-02 DIAGNOSIS — R82.998 LEUKOCYTES IN URINE: Primary | ICD-10-CM

## 2022-06-02 DIAGNOSIS — R10.11 RUQ PAIN: ICD-10-CM

## 2022-06-02 DIAGNOSIS — M05.9 RHEUMATOID ARTHRITIS WITH POSITIVE RHEUMATOID FACTOR, INVOLVING UNSPECIFIED SITE: ICD-10-CM

## 2022-06-02 LAB
ALBUMIN SERPL-MCNC: 3.9 G/DL (ref 3.5–5.2)
ALBUMIN/GLOB SERPL: 1.1 G/DL
ALP SERPL-CCNC: 71 U/L (ref 39–117)
ALT SERPL W P-5'-P-CCNC: 22 U/L (ref 1–33)
ANION GAP SERPL CALCULATED.3IONS-SCNC: 13.4 MMOL/L (ref 5–15)
AST SERPL-CCNC: 27 U/L (ref 1–32)
BASOPHILS # BLD AUTO: 0.05 10*3/MM3 (ref 0–0.2)
BASOPHILS NFR BLD AUTO: 1.2 % (ref 0–1.5)
BILIRUB BLD-MCNC: ABNORMAL MG/DL
BILIRUB SERPL-MCNC: 0.4 MG/DL (ref 0–1.2)
BUN SERPL-MCNC: 9 MG/DL (ref 6–20)
BUN/CREAT SERPL: 10.5 (ref 7–25)
CALCIUM SPEC-SCNC: 9.3 MG/DL (ref 8.6–10.5)
CHLORIDE SERPL-SCNC: 107 MMOL/L (ref 98–107)
CHOLEST SERPL-MCNC: 207 MG/DL (ref 0–200)
CLARITY, POC: ABNORMAL
CO2 SERPL-SCNC: 21.6 MMOL/L (ref 22–29)
COLOR UR: YELLOW
CREAT SERPL-MCNC: 0.86 MG/DL (ref 0.57–1)
DEPRECATED RDW RBC AUTO: 39.4 FL (ref 37–54)
EGFRCR SERPLBLD CKD-EPI 2021: 80.9 ML/MIN/1.73
EOSINOPHIL # BLD AUTO: 0.2 10*3/MM3 (ref 0–0.4)
EOSINOPHIL NFR BLD AUTO: 4.6 % (ref 0.3–6.2)
ERYTHROCYTE [DISTWIDTH] IN BLOOD BY AUTOMATED COUNT: 12.1 % (ref 12.3–15.4)
EXPIRATION DATE: ABNORMAL
GLOBULIN UR ELPH-MCNC: 3.4 GM/DL
GLUCOSE SERPL-MCNC: 92 MG/DL (ref 65–99)
GLUCOSE UR STRIP-MCNC: NEGATIVE MG/DL
HCT VFR BLD AUTO: 44.8 % (ref 34–46.6)
HDLC SERPL-MCNC: 81 MG/DL (ref 40–60)
HGB BLD-MCNC: 15.1 G/DL (ref 12–15.9)
IMM GRANULOCYTES # BLD AUTO: 0 10*3/MM3 (ref 0–0.05)
IMM GRANULOCYTES NFR BLD AUTO: 0 % (ref 0–0.5)
KETONES UR QL: NEGATIVE
LDLC SERPL CALC-MCNC: 115 MG/DL (ref 0–100)
LDLC/HDLC SERPL: 1.4 {RATIO}
LEUKOCYTE EST, POC: ABNORMAL
LYMPHOCYTES # BLD AUTO: 1.79 10*3/MM3 (ref 0.7–3.1)
LYMPHOCYTES NFR BLD AUTO: 41.4 % (ref 19.6–45.3)
Lab: ABNORMAL
MCH RBC QN AUTO: 30.1 PG (ref 26.6–33)
MCHC RBC AUTO-ENTMCNC: 33.7 G/DL (ref 31.5–35.7)
MCV RBC AUTO: 89.2 FL (ref 79–97)
MONOCYTES # BLD AUTO: 0.28 10*3/MM3 (ref 0.1–0.9)
MONOCYTES NFR BLD AUTO: 6.5 % (ref 5–12)
NEUTROPHILS NFR BLD AUTO: 2 10*3/MM3 (ref 1.7–7)
NEUTROPHILS NFR BLD AUTO: 46.3 % (ref 42.7–76)
NITRITE UR-MCNC: NEGATIVE MG/ML
NRBC BLD AUTO-RTO: 0 /100 WBC (ref 0–0.2)
PH UR: 5 [PH] (ref 5–8)
PLATELET # BLD AUTO: 247 10*3/MM3 (ref 140–450)
PMV BLD AUTO: 10.3 FL (ref 6–12)
POTASSIUM SERPL-SCNC: 4.3 MMOL/L (ref 3.5–5.2)
PROT SERPL-MCNC: 7.3 G/DL (ref 6–8.5)
PROT UR STRIP-MCNC: NEGATIVE MG/DL
RBC # BLD AUTO: 5.02 10*6/MM3 (ref 3.77–5.28)
RBC # UR STRIP: NEGATIVE /UL
SODIUM SERPL-SCNC: 142 MMOL/L (ref 136–145)
SP GR UR: 1.02 (ref 1–1.03)
TRIGL SERPL-MCNC: 64 MG/DL (ref 0–150)
UROBILINOGEN UR QL: NORMAL
VLDLC SERPL-MCNC: 11 MG/DL (ref 5–40)
WBC NRBC COR # BLD: 4.32 10*3/MM3 (ref 3.4–10.8)

## 2022-06-02 PROCEDURE — 87522 HEPATITIS C REVRS TRNSCRPJ: CPT | Performed by: NURSE PRACTITIONER

## 2022-06-02 PROCEDURE — 81003 URINALYSIS AUTO W/O SCOPE: CPT | Performed by: NURSE PRACTITIONER

## 2022-06-02 PROCEDURE — 80061 LIPID PANEL: CPT | Performed by: NURSE PRACTITIONER

## 2022-06-02 PROCEDURE — 80050 GENERAL HEALTH PANEL: CPT | Performed by: NURSE PRACTITIONER

## 2022-06-02 PROCEDURE — 87086 URINE CULTURE/COLONY COUNT: CPT | Performed by: NURSE PRACTITIONER

## 2022-06-02 PROCEDURE — 36415 COLL VENOUS BLD VENIPUNCTURE: CPT

## 2022-06-03 LAB
BACTERIA SPEC AEROBE CULT: NO GROWTH
TSH SERPL DL<=0.05 MIU/L-ACNC: 1.29 UIU/ML (ref 0.27–4.2)

## 2022-06-04 LAB
HCV RNA SERPL NAA+PROBE-ACNC: NORMAL IU/ML
HCV RNA SERPL NAA+PROBE-LOG IU: 5.92 LOG10 IU/ML
TEST INFORMATION: NORMAL

## 2022-06-07 ENCOUNTER — HOSPITAL ENCOUNTER (OUTPATIENT)
Dept: ULTRASOUND IMAGING | Facility: HOSPITAL | Age: 52
Discharge: HOME | End: 2022-06-07
Admitting: NURSE PRACTITIONER

## 2022-06-07 DIAGNOSIS — B19.20 HEPATITIS C VIRUS INFECTION WITHOUT HEPATIC COMA, UNSPECIFIED CHRONICITY: ICD-10-CM

## 2022-06-07 DIAGNOSIS — R10.11 RUQ PAIN: ICD-10-CM

## 2022-06-07 PROCEDURE — 76705 ECHO EXAM OF ABDOMEN: CPT

## 2022-06-14 ENCOUNTER — LAB (OUTPATIENT)
Dept: LAB | Facility: HOSPITAL | Age: 52
End: 2022-06-14

## 2022-06-14 ENCOUNTER — OFFICE VISIT (OUTPATIENT)
Dept: GASTROENTEROLOGY | Facility: CLINIC | Age: 52
End: 2022-06-14

## 2022-06-14 VITALS
TEMPERATURE: 97.3 F | SYSTOLIC BLOOD PRESSURE: 130 MMHG | HEIGHT: 65 IN | HEART RATE: 80 BPM | BODY MASS INDEX: 20.33 KG/M2 | WEIGHT: 122 LBS | DIASTOLIC BLOOD PRESSURE: 80 MMHG

## 2022-06-14 DIAGNOSIS — B18.2 CHRONIC HEPATITIS C WITHOUT HEPATIC COMA: Primary | ICD-10-CM

## 2022-06-14 DIAGNOSIS — B18.2 CHRONIC HEPATITIS C WITHOUT HEPATIC COMA: ICD-10-CM

## 2022-06-14 LAB
ALBUMIN SERPL-MCNC: 4.8 G/DL (ref 3.5–5.2)
ALBUMIN/GLOB SERPL: 1.8 G/DL
ALP SERPL-CCNC: 78 U/L (ref 39–117)
ALT SERPL W P-5'-P-CCNC: 27 U/L (ref 1–33)
ANION GAP SERPL CALCULATED.3IONS-SCNC: 12.2 MMOL/L (ref 5–15)
AST SERPL-CCNC: 33 U/L (ref 1–32)
BASOPHILS # BLD AUTO: 0.04 10*3/MM3 (ref 0–0.2)
BASOPHILS NFR BLD AUTO: 0.8 % (ref 0–1.5)
BILIRUB SERPL-MCNC: 0.4 MG/DL (ref 0–1.2)
BUN SERPL-MCNC: 11 MG/DL (ref 6–20)
BUN/CREAT SERPL: 13.4 (ref 7–25)
CALCIUM SPEC-SCNC: 9.6 MG/DL (ref 8.6–10.5)
CHLORIDE SERPL-SCNC: 104 MMOL/L (ref 98–107)
CO2 SERPL-SCNC: 22.8 MMOL/L (ref 22–29)
CREAT SERPL-MCNC: 0.82 MG/DL (ref 0.57–1)
DEPRECATED RDW RBC AUTO: 38.2 FL (ref 37–54)
EGFRCR SERPLBLD CKD-EPI 2021: 85.7 ML/MIN/1.73
EOSINOPHIL # BLD AUTO: 0.8 10*3/MM3 (ref 0–0.4)
EOSINOPHIL NFR BLD AUTO: 15.8 % (ref 0.3–6.2)
ERYTHROCYTE [DISTWIDTH] IN BLOOD BY AUTOMATED COUNT: 12 % (ref 12.3–15.4)
GLOBULIN UR ELPH-MCNC: 2.6 GM/DL
GLUCOSE SERPL-MCNC: 89 MG/DL (ref 65–99)
HCT VFR BLD AUTO: 44.7 % (ref 34–46.6)
HGB BLD-MCNC: 15 G/DL (ref 12–15.9)
HIV1+2 AB SER QL: NORMAL
IMM GRANULOCYTES # BLD AUTO: 0.01 10*3/MM3 (ref 0–0.05)
IMM GRANULOCYTES NFR BLD AUTO: 0.2 % (ref 0–0.5)
INR PPP: 0.94 (ref 0.84–1.13)
LYMPHOCYTES # BLD AUTO: 1.87 10*3/MM3 (ref 0.7–3.1)
LYMPHOCYTES NFR BLD AUTO: 36.9 % (ref 19.6–45.3)
MCH RBC QN AUTO: 29.9 PG (ref 26.6–33)
MCHC RBC AUTO-ENTMCNC: 33.6 G/DL (ref 31.5–35.7)
MCV RBC AUTO: 89.2 FL (ref 79–97)
MONOCYTES # BLD AUTO: 0.41 10*3/MM3 (ref 0.1–0.9)
MONOCYTES NFR BLD AUTO: 8.1 % (ref 5–12)
NEUTROPHILS NFR BLD AUTO: 1.94 10*3/MM3 (ref 1.7–7)
NEUTROPHILS NFR BLD AUTO: 38.2 % (ref 42.7–76)
NRBC BLD AUTO-RTO: 0 /100 WBC (ref 0–0.2)
PLATELET # BLD AUTO: 262 10*3/MM3 (ref 140–450)
PMV BLD AUTO: 9.9 FL (ref 6–12)
POTASSIUM SERPL-SCNC: 4.4 MMOL/L (ref 3.5–5.2)
PROT SERPL-MCNC: 7.4 G/DL (ref 6–8.5)
PROTHROMBIN TIME: 12.4 SECONDS (ref 11.4–14.4)
RBC # BLD AUTO: 5.01 10*6/MM3 (ref 3.77–5.28)
SODIUM SERPL-SCNC: 139 MMOL/L (ref 136–145)
WBC NRBC COR # BLD: 5.07 10*3/MM3 (ref 3.4–10.8)

## 2022-06-14 PROCEDURE — 87902 NFCT AGT GNTYP ALYS HEP C: CPT

## 2022-06-14 PROCEDURE — 80053 COMPREHEN METABOLIC PANEL: CPT

## 2022-06-14 PROCEDURE — 85610 PROTHROMBIN TIME: CPT

## 2022-06-14 PROCEDURE — 81596 NFCT DS CHRNC HCV 6 ASSAYS: CPT

## 2022-06-14 PROCEDURE — G0432 EIA HIV-1/HIV-2 SCREEN: HCPCS

## 2022-06-14 PROCEDURE — 99214 OFFICE O/P EST MOD 30 MIN: CPT | Performed by: NURSE PRACTITIONER

## 2022-06-14 PROCEDURE — 85025 COMPLETE CBC W/AUTO DIFF WBC: CPT

## 2022-06-14 PROCEDURE — 87522 HEPATITIS C REVRS TRNSCRPJ: CPT

## 2022-06-14 NOTE — PROGRESS NOTES
"GASTROENTEROLOGY OFFICE NOTE  Lucero Carrion  [id number]  1970    CARE TEAM  Patient Care Team:  Tiera Melchor APRN as PCP - General (Internal Medicine)  Bonnie Carlson MD as Consulting Physician (Obstetrics and Gynecology)    Referring Provider: Tiera Melchor APRN    Chief Complaint   Patient presents with   • Hepatitis   • Abdominal Pain        HISTORY OF PRESENT ILLNESS:  Ms. Carrion is a 52 y.o. female seen in consultation for Hepatitis C. Documentation available supports diagnosis in December 2019. HCV RNA 580204, AST 27/ALT 22/ALP 71. The patient is treatment naive.     She denies any usual risk factors for hepatitis C.  She has no history of IV drug use, intranasal drug use, blood transfusion before 1994, no  experience, she is never worked in healthcare.  She is never been incarcerated.  She did get a tattoo at the age of 17  that although it was done at a \"professional parlor\" she reports it was a questionable experience.  Her best friend, whom she lived with in the 90s and shared razors and other personal items with had hepatitis C.    The patient  denies extrahepatic manifestations/associated symptoms of HCV. Specifically denies arthralgias, myalgias and pruritis and exhibits no stigmata of advanced liver disease.     She has never had a colonoscopy but completed a Cologuard test and mailed it in last week.  Results pending.    PAST MEDICAL HISTORY  Past Medical History:   Diagnosis Date   • Arthritis     RHEUMATOID   • Asthma    • Other osteoporosis without current pathological fracture 4/12/2019        PAST SURGICAL HISTORY  Past Surgical History:   Procedure Laterality Date   • FINGER FRACTURE SURGERY Left    • WISDOM TOOTH EXTRACTION          MEDICATIONS:    Current Outpatient Medications:   •  albuterol (PROVENTIL HFA;VENTOLIN HFA) 108 (90 BASE) MCG/ACT inhaler, Inhale 2 puffs As Needed for wheezing., Disp: , Rfl:   •  estradiol (MINIVELLE, VIVELLE-DOT) 0.05 MG/24HR patch, Place 1 patch " "on the skin as directed by provider 2 (Two) Times a Week., Disp: 8 each, Rfl: 11  •  ibuprofen (ADVIL,MOTRIN) 200 MG tablet, Take 200 mg by mouth Every 6 (Six) Hours As Needed for Mild Pain ., Disp: , Rfl:   •  MAGNESIUM CITRATE PO, Take  by mouth., Disp: , Rfl:   •  Omega-3 Fatty Acids (OMEGA 3 PO), Take  by mouth., Disp: , Rfl:   •  Progesterone (PROMETRIUM) 100 MG capsule, Take 1 capsule by mouth Daily., Disp: 30 capsule, Rfl: 11    ALLERGIES  No Known Allergies    FAMILY HISTORY:  Family History   Problem Relation Age of Onset   • Osteoporosis Mother    • Asthma Father    • Breast cancer Neg Hx    • Ovarian cancer Neg Hx    • Uterine cancer Neg Hx    • Colon cancer Neg Hx    • Liver cancer Neg Hx    • Liver disease Neg Hx        SOCIAL HISTORY  Social History     Socioeconomic History   • Marital status:    Tobacco Use   • Smoking status: Former Smoker     Packs/day: 0.25     Years: 10.00     Pack years: 2.50     Types: Cigarettes     Quit date: 2009     Years since quittin.4   • Smokeless tobacco: Never Used   Vaping Use   • Vaping Use: Never used   Substance and Sexual Activity   • Alcohol use: Not Currently     Comment: 1 X WEEKLY   • Drug use: No   • Sexual activity: Yes     Partners: Male     Birth control/protection: Condom, Post-menopausal     PHYSICAL EXAM   /80 (BP Location: Left arm, Patient Position: Sitting, Cuff Size: Adult)   Pulse 80   Temp 97.3 °F (36.3 °C) (Temporal)   Ht 165.1 cm (65\")   Wt 55.3 kg (122 lb)   LMP  (LMP Unknown) Comment: LMP: 2016  BMI 20.30 kg/m²   Physical Exam  Constitutional:       Appearance: Normal appearance.   HENT:      Head: Normocephalic and atraumatic.   Pulmonary:      Effort: Pulmonary effort is normal.   Neurological:      Mental Status: She is alert and oriented to person, place, and time.   Psychiatric:         Mood and Affect: Mood normal.         Thought Content: Thought content normal.       Results Review:  Contains abnormal " data Hepatitis Panel, Acute  Order: [id number] - Part of Panel Order [id number]   Status: Final result     Visible to patient: Yes (seen)     Next appt: 07/25/2022 at 02:50 PM in Obstetrics and Gynecology (Bonnie Carlson MD)     Dx: Health care maintenance    Specimen Information: Arm, Right; Blood         0 Result Notes     1 Follow-up Encounter    Component   Ref Range & Units 2 yr ago    Hepatitis B Surface Ag   Non-Reactive Non-Reactive    Hep A IgM   Non-Reactive Non-Reactive    Hep B C IgM   Non-Reactive Non-Reactive    Hepatitis C Ab   Non-Reactive Reactive Abnormal     Resulting Agency [facility]              Specimen Collected: 12/30/19 10:17 Last Resulted: 12/31/19 03:52            Latest Reference Range & Units 06/02/22 11:20   Glucose 65 - 99 mg/dL 92   Sodium 136 - 145 mmol/L 142   Potassium 3.5 - 5.2 mmol/L 4.3   CO2 22.0 - 29.0 mmol/L 21.6 (L)   Chloride 98 - 107 mmol/L 107   Anion Gap 5.0 - 15.0 mmol/L 13.4   Creatinine 0.57 - 1.00 mg/dL 0.86   BUN 6 - 20 mg/dL 9   BUN/Creatinine Ratio 7.0 - 25.0  10.5   Calcium 8.6 - 10.5 mg/dL 9.3   EGFR >60.0 mL/min/1.73 80.9 [1]   Alkaline Phosphatase 39 - 117 U/L 71   Total Protein 6.0 - 8.5 g/dL 7.3   ALT (SGPT) 1 - 33 U/L 22   AST (SGOT) 1 - 32 U/L 27   Total Bilirubin 0.0 - 1.2 mg/dL 0.4   Albumin 3.50 - 5.20 g/dL 3.90   Globulin gm/dL 3.4   A/G Ratio g/dL 1.1      Latest Reference Range & Units 06/02/22 11:20   Hepatitis C Quantitation IU/mL [id number]   US ABDOMEN LIMITED-     Date of Exam: 6/7/2022 1:14 PM     Indication: ruq pain; R10.11-Right upper quadrant pain;  B19.20-Unspecified viral hepatitis C without hepatic coma.     Comparison: None available.     Technique: Transverse and sagittal ultrasound images of the right upper  quadrant were obtained. Doppler evaluation was also conducted.     FINDINGS:  Visualized portions of the head and body of the pancreas are normal.   Bowel gas limits evaluation of the pancreatic tail.     Liver has homogenous  echogenicity and normal echotexture.  No focal  hepatic lesions.  Portal and hepatic veins are patent and have normal  flow direction and waveforms.      Gallbladder is sonographically normal.  Negative sonographic Han's  sign.     The biliary system is nondilated.      The right kidney is sonographically normal.    The common bile duct measures 3 mm.           IMPRESSION:  Normal right upper quadrant ultrasound examination.     This report was finalized on 6/7/2022 8:28 PM by Valentina Cisneros MD  ASSESSMENT / PLAN  1)Hepatitis C  -diagnosed December 2019  -HCV risk factors as discussed in HPI  -treatment naive    2) Health Maintenance  -Discussed the nature, workup, and transmission of Hepatitis C virus; advised to avoid sharing toothbrush, dental and shaving equipment, nail clippers  -Will check HAV/HBV immunity; will need vaccinations if not    Plan:  -Outside records reviewed   -CBC, CMP, PT/INR, HCV FibroSure, HCVRNA quantitative, HIV    Return in about 4 weeks (around 7/12/2022).    I discussed the patients findings and my recommendations with patient    WENDY Seymour

## 2022-06-17 LAB
A2 MACROGLOB SERPL-MCNC: 303 MG/DL (ref 110–276)
ALT SERPL W P-5'-P-CCNC: 32 IU/L (ref 0–40)
APO A-I SERPL-MCNC: 211 MG/DL (ref 116–209)
BILIRUB SERPL-MCNC: 0.4 MG/DL (ref 0–1.2)
FIBROSIS SCORING:: ABNORMAL
FIBROSIS STAGE SERPL QL: ABNORMAL
GGT SERPL-CCNC: 25 IU/L (ref 0–60)
HAPTOGLOB SERPL-MCNC: 79 MG/DL (ref 33–346)
HCV AB SER QL: ABNORMAL
HCV GENTYP SERPL NAA+PROBE: NORMAL
HCV GENTYP SERPL NAA+PROBE: NORMAL
HCV RNA SERPL NAA+PROBE-ACNC: NORMAL IU/ML
HCV RNA SERPL NAA+PROBE-LOG IU: 5.63 LOG10 IU/ML
LABORATORY COMMENT REPORT: ABNORMAL
LABORATORY COMMENT REPORT: NORMAL
LABORATORY COMMENT REPORT: NORMAL
LIVER FIBR SCORE SERPL CALC.FIBROSURE: 0.2 (ref 0–0.21)
NECROINFLAMM ACTIVITY SCORING:: ABNORMAL
NECROINFLAMMATORY ACT GRADE SERPL QL: ABNORMAL
NECROINFLAMMATORY ACT SCORE SERPL: 0.14 (ref 0–0.17)
SERVICE CMNT-IMP: ABNORMAL

## 2022-07-01 ENCOUNTER — TELEPHONE (OUTPATIENT)
Dept: GASTROENTEROLOGY | Facility: CLINIC | Age: 52
End: 2022-07-01

## 2022-07-01 NOTE — TELEPHONE ENCOUNTER
----- Message from Malika Hodgson sent at 7/1/2022  2:56 PM EDT -----   PATIENT LEFT MESSAGE THAT ANIA TOLD HER TO CALL PHARMACY RE: ANTI VIRAL MEDICATION. [facility] CALLED HER. WHEN I CALLED PATIENT SHE WAS SURE WHAT WAS NEXT. PLEASE CALL PATIENT. TRANSFERRED CALL TO YUSUF. THANKS

## 2022-07-01 NOTE — TELEPHONE ENCOUNTER
I returned patients phone call and let her know that [facility] was working on getting her medication approved. I also explained that once they approve the medication someone will reach out to her from their pharmacy to go over medication and then they will contact us and set up the first fill to come to our office and I would schedule her to come in for appointment with Miguel NGUYEN. Patient verbalized understanding and had no further questions.

## 2022-07-07 ENCOUNTER — TELEPHONE (OUTPATIENT)
Dept: INTERNAL MEDICINE | Facility: CLINIC | Age: 52
End: 2022-07-07

## 2022-07-07 DIAGNOSIS — J45.20 MILD INTERMITTENT ASTHMA WITHOUT COMPLICATION: Primary | ICD-10-CM

## 2022-07-07 RX ORDER — ALBUTEROL SULFATE 90 UG/1
2 AEROSOL, METERED RESPIRATORY (INHALATION) EVERY 4 HOURS PRN
Qty: 18 G | Refills: 0 | Status: SHIPPED | OUTPATIENT
Start: 2022-07-07

## 2022-07-07 RX ORDER — ALBUTEROL SULFATE 90 UG/1
2 AEROSOL, METERED RESPIRATORY (INHALATION) AS NEEDED
Status: CANCELLED | OUTPATIENT
Start: 2022-07-07

## 2022-07-07 NOTE — TELEPHONE ENCOUNTER
Caller: Sheyla Lucero    Relationship: Self    Best call back number: [phone]    Requested Prescriptions:   Requested Prescriptions     Pending Prescriptions Disp Refills   • albuterol sulfate  (90 Base) MCG/ACT inhaler       Sig: Inhale 2 puffs As Needed for Wheezing.        Pharmacy where request should be sent: [facility] [address] [facility] [phone] [facility] [phone]      Additional details provided by patient: PATIENT WAS IN THE OFFICE ON 6/14/22 AND DOCTOR BENY DISCUSSED CALLING IN PATIENTS ALBUTEROL.  MESSAGE CAME UP ABOUT FORMULARY. BE SURE TO CALL IN CORRECT DOSAGE AND INHALER PATIENT HAS ALWAYS USED    Does the patient have less than a 3 day supply:  [x] Yes  [] No    Chichi Yao Rep   07/07/22 11:13 EDT

## 2022-07-20 ENCOUNTER — OFFICE VISIT (OUTPATIENT)
Dept: GASTROENTEROLOGY | Facility: CLINIC | Age: 52
End: 2022-07-20

## 2022-07-20 VITALS
DIASTOLIC BLOOD PRESSURE: 72 MMHG | HEART RATE: 60 BPM | WEIGHT: 124 LBS | SYSTOLIC BLOOD PRESSURE: 126 MMHG | TEMPERATURE: 97.7 F | OXYGEN SATURATION: 97 % | BODY MASS INDEX: 20.66 KG/M2 | HEIGHT: 65 IN

## 2022-07-20 DIAGNOSIS — B18.2 CHRONIC HEPATITIS C WITHOUT HEPATIC COMA: Primary | ICD-10-CM

## 2022-07-20 PROCEDURE — 99214 OFFICE O/P EST MOD 30 MIN: CPT | Performed by: NURSE PRACTITIONER

## 2022-07-20 RX ORDER — VELPATASVIR AND SOFOSBUVIR 100; 400 MG/1; MG/1
TABLET, FILM COATED ORAL
COMMUNITY
Start: 2022-07-05 | End: 2022-10-20

## 2022-07-20 NOTE — PROGRESS NOTES
GASTROENTEROLOGY OFFICE NOTE  Lucero Carrion  [id number]  1970    CARE TEAM  Patient Care Team:  Tiera Melchor APRN as PCP - General (Internal Medicine)  Bonnie Carlson MD as Consulting Physician (Obstetrics and Gynecology)    Referring Provider: Tiera Melchor APRN    Chief Complaint   Patient presents with   • Med Management        HISTORY OF PRESENT ILLNESS:  Patient is seen in follow-up to begin treatment for chronic hepatitis C, genotype 1a.  Pretreatment evaluation indicates F0 liver fibrosis per HCV FibroSure testing, HCVRNA [id number].  She is not immune to hepatitis A or hepatitis B.    PAST MEDICAL HISTORY  Past Medical History:   Diagnosis Date   • Arthritis     RHEUMATOID   • Asthma    • Other osteoporosis without current pathological fracture 4/12/2019        PAST SURGICAL HISTORY  Past Surgical History:   Procedure Laterality Date   • FINGER FRACTURE SURGERY Left    • WISDOM TOOTH EXTRACTION          MEDICATIONS:    Current Outpatient Medications:   •  albuterol sulfate  (90 Base) MCG/ACT inhaler, Inhale 2 puffs Every 4 (Four) Hours As Needed for Wheezing., Disp: 18 g, Rfl: 0  •  estradiol (MINIVELLE, VIVELLE-DOT) 0.05 MG/24HR patch, Place 1 patch on the skin as directed by provider 2 (Two) Times a Week., Disp: 8 each, Rfl: 11  •  ibuprofen (ADVIL,MOTRIN) 200 MG tablet, Take 200 mg by mouth Every 6 (Six) Hours As Needed for Mild Pain ., Disp: , Rfl:   •  MAGNESIUM CITRATE PO, Take  by mouth., Disp: , Rfl:   •  Omega-3 Fatty Acids (OMEGA 3 PO), Take  by mouth., Disp: , Rfl:   •  Progesterone (PROMETRIUM) 100 MG capsule, Take 1 capsule by mouth Daily., Disp: 30 capsule, Rfl: 11  •  Sofosbuvir-Velpatasvir 400-100 MG tablet, , Disp: , Rfl:     ALLERGIES  No Known Allergies    FAMILY HISTORY:  Family History   Problem Relation Age of Onset   • Osteoporosis Mother    • Asthma Father    • Breast cancer Neg Hx    • Ovarian cancer Neg Hx    • Uterine cancer Neg Hx    • Colon cancer Neg Hx    • Liver  "cancer Neg Hx    • Liver disease Neg Hx        SOCIAL HISTORY  Social History     Socioeconomic History   • Marital status:    Tobacco Use   • Smoking status: Former Smoker     Packs/day: 0.25     Years: 10.00     Pack years: 2.50     Types: Cigarettes     Quit date: 2009     Years since quittin.5   • Smokeless tobacco: Never Used   Vaping Use   • Vaping Use: Never used   Substance and Sexual Activity   • Alcohol use: Not Currently     Comment: 1 X WEEKLY   • Drug use: No   • Sexual activity: Yes     Partners: Male     Birth control/protection: Condom, Post-menopausal           PHYSICAL EXAM   /72 (BP Location: Left arm, Patient Position: Sitting, Cuff Size: Adult)   Pulse 60   Temp 97.7 °F (36.5 °C) (Infrared)   Ht 165.1 cm (65\")   Wt 56.2 kg (124 lb)   LMP  (LMP Unknown) Comment: LMP: 2016  SpO2 97%   BMI 20.63 kg/m²   Physical Exam  Constitutional:       Appearance: Normal appearance.   HENT:      Head: Normocephalic and atraumatic.   Pulmonary:      Effort: Pulmonary effort is normal.   Neurological:      Mental Status: She is alert and oriented to person, place, and time.   Psychiatric:         Mood and Affect: Mood normal.         Thought Content: Thought content normal.           Results Review:   Latest Reference Range & Units 22 10:09   Alkaline Phosphatase 39 - 117 U/L 78   Total Protein 6.0 - 8.5 g/dL 7.4   ALT (SGPT) 0 - 40 U/L  1 - 33 U/L 32  27   AST (SGOT) 1 - 32 U/L 33 (H)   Total Bilirubin 0.0 - 1.2 mg/dL  0.0 - 1.2 mg/dL 0.4  0.4   (H): Data is abnormally high   22 10:09   Hepatitis C Genotype 1a      Latest Reference Range & Units 22 10:09   Fibrosis Score 0.00 - 0.21  0.20      Latest Reference Range & Units 22 10:09   Hepatitis C Quantitation IU/mL [id number]     ASSESSMENT / PLAN  1. Chronic Hepatitis C    Discussed various aspects of treatment includin. How to contact office staff for any questions/concerns..   2. Refill process, Rx " coming from [facility] pharmacy and contact information given to patient.  First fill was mobile dispensed to patient in GI Clinic.  3. Given general information on Epclusa, HCV, and sunscreen use.  Patient instructed to replace razor, toothbrush, nail file/clippers, and tweezers in 30 days.  4. Follow up appointments and bloodwork schedule discussed with patient.  5. Patient medication list was reviewed and updated for accuracy.  Updated med list reviewed for DDIs. No drug interactions identified via Hepatitis Lin.   Patient understands the importance of contacting my office prior to any changes/additions in medication regimen.   6. Counseled on appearance and packaging of medications, reminded of keep out of reach of children. Reviewed  Epclusa daily dosing schedule (1 tablet once daily at same time each day), potential side effects and how to prevent and treat them including staying well hydrated with plenty of water (6-8 glasses of water/day), Tylenol up to 2000 mg total per day (4 ES tabs/day or 6 regular tabs/day) if needed for headache, moisturizing lotions for any skin reactions and report if any concern, medication storage at room temperature, pregnancy considerations (TWO forms of barrier birth control to prevent pregancy), and taking medication to the hospital, if admitted.  Plan:  - Epclusa 400/100 mg 1 p.o. daily for 12 weeks    2.  Health maintenance  - Vaccinations to hepatitis A and hepatitis B recommended.  Patient is interested/agreeable but would like to wait to begin vaccination series until after completion of HCV treatment.    Return in about 3 months (around 10/20/2022).    I discussed the patients findings and my recommendations with patient    WENDY Seymour

## 2022-07-25 ENCOUNTER — LAB (OUTPATIENT)
Dept: LAB | Facility: HOSPITAL | Age: 52
End: 2022-07-25

## 2022-07-25 ENCOUNTER — OFFICE VISIT (OUTPATIENT)
Dept: OBSTETRICS AND GYNECOLOGY | Facility: CLINIC | Age: 52
End: 2022-07-25

## 2022-07-25 VITALS — WEIGHT: 123.2 LBS | DIASTOLIC BLOOD PRESSURE: 60 MMHG | BODY MASS INDEX: 20.5 KG/M2 | SYSTOLIC BLOOD PRESSURE: 100 MMHG

## 2022-07-25 DIAGNOSIS — N95.1 SYMPTOMATIC MENOPAUSAL OR FEMALE CLIMACTERIC STATES: ICD-10-CM

## 2022-07-25 DIAGNOSIS — Z76.89 ESTABLISHING CARE WITH NEW DOCTOR, ENCOUNTER FOR: ICD-10-CM

## 2022-07-25 DIAGNOSIS — Z01.419 WELL WOMAN EXAM WITH ROUTINE GYNECOLOGICAL EXAM: Primary | ICD-10-CM

## 2022-07-25 DIAGNOSIS — Z01.419 WELL WOMAN EXAM WITH ROUTINE GYNECOLOGICAL EXAM: ICD-10-CM

## 2022-07-25 LAB — 25(OH)D3 SERPL-MCNC: 46.3 NG/ML (ref 30–100)

## 2022-07-25 PROCEDURE — 82306 VITAMIN D 25 HYDROXY: CPT

## 2022-07-25 PROCEDURE — 99396 PREV VISIT EST AGE 40-64: CPT | Performed by: OBSTETRICS & GYNECOLOGY

## 2022-07-25 RX ORDER — PROGESTERONE 100 MG/1
100 CAPSULE ORAL DAILY
Qty: 90 CAPSULE | Refills: 4 | Status: SHIPPED | OUTPATIENT
Start: 2022-07-25

## 2022-07-25 RX ORDER — ESTRADIOL 0.05 MG/D
1 FILM, EXTENDED RELEASE TRANSDERMAL 2 TIMES WEEKLY
Qty: 24 EACH | Refills: 4 | Status: SHIPPED | OUTPATIENT
Start: 2022-07-25

## 2022-07-25 NOTE — PROGRESS NOTES
Subjective   Chief Complaint   Patient presents with   • Annual Exam     No c/c     Lucero Carrion is a 52 y.o. year old  menopausal female presenting to be seen for her annual exam.  This past year she has been on hormone replacement therapy.  There has not been vaginal bleeding in the last 12 months.  Menopausal symptoms are present (hot flashes) and are significantly affecting her quality of life.    SEXUAL Hx:  She is currently sexually active.  In the past year there there has been NO new sexual partners.    Condoms are never used.  She would not like to be screened for STD's at today's exam.  [place] is painful: no  HEALTH Hx:  She exercises regularly: yes.  She wears her seat belt: yes.  She has concerns about domestic violence: no.  OTHER THINGS SHE WANTS TO DISCUSS TODAY:  Nothing else    The following portions of the patient's history were reviewed and updated as appropriate:problem list, current medications, allergies, past family history, past medical history, past social history and past surgical history.    Social History    Tobacco Use      Smoking status: Former Smoker        Packs/day: 0.25        Years: 10.00        Pack years: 2.5        Types: Cigarettes, Cigarettes        Quit date: 2009        Years since quittin.5      Smokeless tobacco: Never Used      Review of Systems  Constitutional POS: nothing reported    NEG: anorexia or night sweats   Genitourinary POS: nothing reported    NEG: dysuria or hematuria      Gastointestinal POS: nothing reported    NEG: bloating, change in bowel habits, melena or reflux symptoms   Integument POS: nothing reported    NEG: moles that are changing in size, shape, color or rashes   Breast POS: nothing reported    NEG: persistent breast lump, skin dimpling or nipple discharge        Objective   /60 (BP Location: Right arm, Patient Position: Sitting, Cuff Size: Small Adult)   Wt 55.9 kg (123 lb 3.2 oz)   LMP  (LMP Unknown) Comment: LMP:  Nov 2016  Breastfeeding No   BMI 20.50 kg/m²     General:  well developed; well nourished  no acute distress   Skin:  No suspicious lesions seen   Thyroid: normal to inspection and palpation   Breasts:  Examined in supine position  Symmetric without masses or skin dimpling  Nipples normal without inversion, lesions or discharge  There are no palpable axillary nodes   Abdomen: soft, non-tender; no masses  no umbilical or inguinal hernias are present  no hepato-splenomegaly   Pelvis: Clinical staff was present for exam  External genitalia:  normal appearance of the external genitalia including Bartholin's and [place]'s glands.  :  urethral meatus normal;  Vaginal:  atrophic mucosal changes are present;  Cervix:  normal appearance.  Uterus:  normal size, shape and consistency.  Adnexa:  normal bimanual exam of the adnexa.  Rectal:  digital rectal exam not performed; anus visually normal appearing.        Assessment   1. Normal GYN exam in menopause  2. Osteoporosis currently not on treatment; she has previously declined  3. She is up to date on all relevant gynecologic and colorectal screenings except DEXA's for osteoporosis     Plan   Pap was not done today.  I explained to Lucero that the recommendations for Pap smear interval in a low risk patient has lengthened to 5 years time with history of normal pap co-test.  I told Lucero she still needs to be seen in our office yearly for a full physical including breast and pelvic exam.  She was encouraged to get yearly mammograms.  She should report any palpable breast lump(s) or skin changes regardless of mammographic findings.  I explained to Lucero that notification regarding her mammogram results will come from the center performing the study.  Our office will not be routinely calling with mammogram results.  It is her responsibility to make sure that the results from the mammogram are communicated to her by the breast center.  If she has any questions about the results,  she is welcome to call our office anytime.  Reviewed normal Cologuard.  Reviewed this needs to be done every 3 years  Refills for HRT   Reviewed the importance of exercise 2-3 times per week with at least 20-30 minutes of cardio  Reviewed the importance of good vitamin D and calcium intake and weightbearing exercises  The following tests were ordered today: vitamin D 25-OH.  It was explained to Lucero that all lab test should be back within the one week after they are performed. She will be notified about the results, regardless of the findings. If she has not been contacted by the office within 2 weeks after the test has been performed, it is her responsibility to contact us to learn about her results.  DEXA scan, DEXA scan ordered to follow up existing osteoporosis.  The importance of keeping all planned follow-up and taking all medications as prescribed was emphasized.  Follow up for annual exam in ~ one year.     New Medications Ordered This Visit   Medications   • estradiol (MINIVELLE, VIVELLE-DOT) 0.05 MG/24HR patch     Sig: Place 1 patch on the skin as directed by provider 2 (Two) Times a Week.     Dispense:  24 each     Refill:  4   • Progesterone (PROMETRIUM) 100 MG capsule     Sig: Take 1 capsule by mouth Daily.     Dispense:  90 capsule     Refill:  4          This note was electronically signed.    Bonnie Carlson MD  July 25, 2022

## 2022-08-02 ENCOUNTER — HOSPITAL ENCOUNTER (OUTPATIENT)
Dept: BONE DENSITY | Facility: HOSPITAL | Age: 52
Discharge: HOME | End: 2022-08-02
Admitting: OBSTETRICS & GYNECOLOGY

## 2022-08-02 DIAGNOSIS — N95.1 SYMPTOMATIC MENOPAUSAL OR FEMALE CLIMACTERIC STATES: ICD-10-CM

## 2022-08-02 PROCEDURE — 77080 DXA BONE DENSITY AXIAL: CPT

## 2022-08-14 PROBLEM — B19.20 HEPATITIS C: Status: ACTIVE | Noted: 2022-08-14

## 2022-08-21 DIAGNOSIS — M81.8 OTHER OSTEOPOROSIS WITHOUT CURRENT PATHOLOGICAL FRACTURE: Primary | ICD-10-CM

## 2022-08-22 ENCOUNTER — TELEPHONE (OUTPATIENT)
Dept: OBSTETRICS AND GYNECOLOGY | Facility: CLINIC | Age: 52
End: 2022-08-22

## 2022-08-22 NOTE — TELEPHONE ENCOUNTER
Dr. Jose Welch's office called and wanted to let us know that Dr. Welch does not see patient's for bone issues, but other providers in their clinic does.  He stated that he will get her scheduled with one of them, but did want to make us aware that she will not be seeing Dr. Welch.

## 2022-10-20 ENCOUNTER — OFFICE VISIT (OUTPATIENT)
Dept: GASTROENTEROLOGY | Facility: CLINIC | Age: 52
End: 2022-10-20

## 2022-10-20 ENCOUNTER — LAB (OUTPATIENT)
Dept: LAB | Facility: HOSPITAL | Age: 52
End: 2022-10-20

## 2022-10-20 VITALS
BODY MASS INDEX: 21.36 KG/M2 | DIASTOLIC BLOOD PRESSURE: 72 MMHG | SYSTOLIC BLOOD PRESSURE: 134 MMHG | HEART RATE: 64 BPM | WEIGHT: 128.2 LBS | TEMPERATURE: 96.9 F | HEIGHT: 65 IN

## 2022-10-20 DIAGNOSIS — B18.2 CHRONIC HEPATITIS C WITHOUT HEPATIC COMA: Primary | ICD-10-CM

## 2022-10-20 DIAGNOSIS — B18.2 CHRONIC HEPATITIS C WITHOUT HEPATIC COMA: ICD-10-CM

## 2022-10-20 LAB
ALBUMIN SERPL-MCNC: 4.4 G/DL (ref 3.5–5.2)
ALBUMIN/GLOB SERPL: 1.5 G/DL
ALP SERPL-CCNC: 72 U/L (ref 39–117)
ALT SERPL W P-5'-P-CCNC: 11 U/L (ref 1–33)
ANION GAP SERPL CALCULATED.3IONS-SCNC: 10.7 MMOL/L (ref 5–15)
AST SERPL-CCNC: 22 U/L (ref 1–32)
BILIRUB SERPL-MCNC: 0.4 MG/DL (ref 0–1.2)
BUN SERPL-MCNC: 11 MG/DL (ref 6–20)
BUN/CREAT SERPL: 12.4 (ref 7–25)
CALCIUM SPEC-SCNC: 9.8 MG/DL (ref 8.6–10.5)
CHLORIDE SERPL-SCNC: 103 MMOL/L (ref 98–107)
CO2 SERPL-SCNC: 25.3 MMOL/L (ref 22–29)
CREAT SERPL-MCNC: 0.89 MG/DL (ref 0.57–1)
EGFRCR SERPLBLD CKD-EPI 2021: 77.6 ML/MIN/1.73
GLOBULIN UR ELPH-MCNC: 3 GM/DL
GLUCOSE SERPL-MCNC: 94 MG/DL (ref 65–99)
POTASSIUM SERPL-SCNC: 4.3 MMOL/L (ref 3.5–5.2)
PROT SERPL-MCNC: 7.4 G/DL (ref 6–8.5)
SODIUM SERPL-SCNC: 139 MMOL/L (ref 136–145)

## 2022-10-20 PROCEDURE — 87522 HEPATITIS C REVRS TRNSCRPJ: CPT

## 2022-10-20 PROCEDURE — 80053 COMPREHEN METABOLIC PANEL: CPT

## 2022-10-20 PROCEDURE — 99214 OFFICE O/P EST MOD 30 MIN: CPT | Performed by: NURSE PRACTITIONER

## 2022-10-20 RX ORDER — BETAMETHASONE DIPROPIONATE 0.5 MG/G
CREAM TOPICAL
COMMUNITY
Start: 2022-10-06

## 2022-10-20 RX ORDER — PIMECROLIMUS 1 %
CREAM (GRAM) TOPICAL
COMMUNITY
Start: 2022-10-06

## 2022-10-20 NOTE — PROGRESS NOTES
GASTROENTEROLOGY OFFICE NOTE  Lucero Carrion  [id number]  1970    CARE TEAM  Patient Care Team:  Tiera Melchor APRN as PCP - General (Internal Medicine)  Bonnie Carlson MD as Consulting Physician (Obstetrics and Gynecology)    Referring Provider: Tiera Melchor APRN    Chief Complaint   Patient presents with   • Hepatitis        HISTORY OF PRESENT ILLNESS:  Patient presents in follow-up after completion of Epclusa for treatment of hepatitis C, genotype Ia.  She did well with treatment, having no missed doses.  She feels like Epclusa probably made her a little bit tired during the first month of treatment but this resolved as time went on.    PAST MEDICAL HISTORY  Past Medical History:   Diagnosis Date   • Arthritis     RHEUMATOID   • Asthma    • Hepatitis C 2020   • Hepatitis C 8/14/2022   • Other osteoporosis without current pathological fracture 04/12/2019   • PMS (premenstrual syndrome)         PAST SURGICAL HISTORY  Past Surgical History:   Procedure Laterality Date   • FINGER FRACTURE SURGERY Left    • WISDOM TOOTH EXTRACTION          MEDICATIONS:    Current Outpatient Medications:   •  albuterol sulfate  (90 Base) MCG/ACT inhaler, Inhale 2 puffs Every 4 (Four) Hours As Needed for Wheezing., Disp: 18 g, Rfl: 0  •  estradiol (MINIVELLE, VIVELLE-DOT) 0.05 MG/24HR patch, Place 1 patch on the skin as directed by provider 2 (Two) Times a Week., Disp: 24 each, Rfl: 4  •  ibuprofen (ADVIL,MOTRIN) 200 MG tablet, Take 200 mg by mouth Every 6 (Six) Hours As Needed for Mild Pain ., Disp: , Rfl:   •  MAGNESIUM CITRATE PO, Take  by mouth., Disp: , Rfl:   •  Omega-3 Fatty Acids (OMEGA 3 PO), Take  by mouth., Disp: , Rfl:   •  Progesterone (PROMETRIUM) 100 MG capsule, Take 1 capsule by mouth Daily., Disp: 90 capsule, Rfl: 4  •  betamethasone dipropionate 0.05 % cream, , Disp: , Rfl:   •  Elidel 1 % cream, , Disp: , Rfl:   •  mupirocin (BACTROBAN) 2 % ointment, , Disp: , Rfl:     ALLERGIES  No Known  "Allergies    FAMILY HISTORY:  Family History   Problem Relation Age of Onset   • Osteoporosis Mother    • Asthma Father    • Breast cancer Neg Hx    • Ovarian cancer Neg Hx    • Uterine cancer Neg Hx    • Colon cancer Neg Hx    • Liver cancer Neg Hx    • Liver disease Neg Hx    • Pancreatic cancer Neg Hx        SOCIAL HISTORY  Social History     Socioeconomic History   • Marital status:    Tobacco Use   • Smoking status: Former     Packs/day: 0.25     Years: 10.00     Pack years: 2.50     Types: Cigarettes     Quit date: 2009     Years since quittin.8   • Smokeless tobacco: Never   Vaping Use   • Vaping Use: Never used   Substance and Sexual Activity   • Alcohol use: Yes     Comment: 1x monthly or less   • Drug use: No   • Sexual activity: Yes     Partners: Male     Birth control/protection: Condom, Post-menopausal       PHYSICAL EXAM   /72 (BP Location: Left arm, Patient Position: Sitting, Cuff Size: Adult)   Pulse 64   Temp 96.9 °F (36.1 °C) (Temporal)   Ht 165.1 cm (65\")   Wt 58.2 kg (128 lb 3.2 oz)   LMP  (LMP Unknown) Comment: LMP: 2016  BMI 21.33 kg/m²   Physical Exam  Constitutional:       Appearance: Normal appearance.   HENT:      Head: Normocephalic and atraumatic.   Pulmonary:      Effort: Pulmonary effort is normal.   Neurological:      Mental Status: She is alert and oriented to person, place, and time.   Psychiatric:         Mood and Affect: Mood normal.         Thought Content: Thought content normal.           Results Review:  Component Ref Range & Units 4 mo ago   (22) 4 mo ago   (22)   Hepatitis C Quantitation IU/mL [id number]  [id number]    HCV log10 log10 IU/mL 5.631  5.924          ASSESSMENT / PLAN  1.  Chronic hepatitis C, genotype Ia  Treatment complete with Epclusa  - HCVRNA, CMP  - HCVRNA in 3 months to establish SVR    2.  Health maintenance  - Hepatitis A and B vaccinations recommended, patient declined at this time but plans to have this done in the " future.    Return in about 3 months (around 1/20/2023) for bloodwork only.    I discussed the patients findings and my recommendations with patient    WENDY Seymour

## 2022-10-24 DIAGNOSIS — B18.2 CHRONIC HEPATITIS C WITHOUT HEPATIC COMA: Primary | ICD-10-CM

## 2022-10-24 LAB
HCV GENTYP SERPL NAA+PROBE: NORMAL
HCV RNA SERPL NAA+PROBE-ACNC: NORMAL IU/ML
HCV RNA SERPL NAA+PROBE-LOG IU: NORMAL LOG10 IU/ML
LABORATORY COMMENT REPORT: NORMAL

## 2023-02-16 ENCOUNTER — LAB (OUTPATIENT)
Dept: LAB | Facility: HOSPITAL | Age: 53
End: 2023-02-16
Payer: COMMERCIAL

## 2023-02-16 DIAGNOSIS — B18.2 CHRONIC HEPATITIS C WITHOUT HEPATIC COMA: ICD-10-CM

## 2023-02-16 PROCEDURE — 87522 HEPATITIS C REVRS TRNSCRPJ: CPT

## 2023-04-24 ENCOUNTER — TELEPHONE (OUTPATIENT)
Dept: INTERNAL MEDICINE | Facility: CLINIC | Age: 53
End: 2023-04-24
Payer: COMMERCIAL

## 2023-04-24 NOTE — TELEPHONE ENCOUNTER
Caller: GUTIERREZ [facility]    Relationship: Other    Best call back number: [phone]    What is the best time to reach you: ANY    Who are you requesting to speak with (clinical staff, provider,  specific staff member): CLINICAL    What was the call regarding: GUTIERREZ CALLING TO ASK IF NICKY WILL BE FOLLOWING THE PATIENT DURING HER PHYSICAL THERAPY REFERRED BY [facility] DUE TO A LEFT HIP FRACTURE.    Do you require a callback: YES, PLEASE CALL TO ADVISE.     THANK YOU.

## 2023-04-25 NOTE — TELEPHONE ENCOUNTER
Left message for patient to return call.    HUB- Please let patient know she needs a hospital follow up and schedule

## 2023-04-28 NOTE — TELEPHONE ENCOUNTER
Spoke with patient , patient states that she can not come in office due to her broken hip. Can she have a video visit?

## 2023-05-04 ENCOUNTER — TELEMEDICINE (OUTPATIENT)
Dept: INTERNAL MEDICINE | Facility: CLINIC | Age: 53
End: 2023-05-04
Payer: COMMERCIAL

## 2023-05-04 VITALS — BODY MASS INDEX: 20.8 KG/M2 | WEIGHT: 125 LBS

## 2023-05-04 DIAGNOSIS — M81.8 OTHER OSTEOPOROSIS WITHOUT CURRENT PATHOLOGICAL FRACTURE: Primary | ICD-10-CM

## 2023-05-04 DIAGNOSIS — S72.002D CLOSED FRACTURE OF LEFT HIP WITH ROUTINE HEALING, SUBSEQUENT ENCOUNTER: ICD-10-CM

## 2023-05-04 RX ORDER — ASPIRIN 81 MG/1
81 TABLET ORAL
COMMUNITY
Start: 2023-04-23 | End: 2023-05-23

## 2023-05-05 ENCOUNTER — TELEMEDICINE (OUTPATIENT)
Dept: INTERNAL MEDICINE | Facility: CLINIC | Age: 53
End: 2023-05-05
Payer: COMMERCIAL

## 2023-05-05 VITALS — WEIGHT: 125 LBS | BODY MASS INDEX: 20.8 KG/M2

## 2023-05-05 DIAGNOSIS — S72.002D CLOSED FRACTURE OF LEFT HIP WITH ROUTINE HEALING, SUBSEQUENT ENCOUNTER: Primary | ICD-10-CM

## 2023-05-05 NOTE — PROGRESS NOTES
Chief Complaint  Hospital Follow Up Visit    Subjective          Lucero Carrion presents to [facility]  History of Present Illness    This was an audio and video enabled telemedicine encounter.    You have chosen to receive care through a telehealth visit.  Do you consent to use a video/audio connection for your medical care today? Yes    The patient presents today via video visit for a follow-up.    Left hip fracture  The patient was admitted to [facility] on 04/21/2023 after falling off a horse with a fracture of the left hip. She reports being pushed by a horse and fractured her left hip. The patient underwent a left hip repair on 04/21/2023. She reports having a large piece of metal placed in her left hip. The patient is following with the surgeon since discharge. She saw the surgeon on 05/04/2023. The patient has been doing physical therapy. She denies any fevers. The incision site is without any redness or discharge. The surgeon took the bandage off 05/04/2023. The incision site is painful, bruised, and dry. The surgeon stated that the alignment of the dmitriy in her leg is well placed. The patient is taking enteric-coated aspirin 81 mg daily. Her pain is severe, especially when the physical therapy comes. The patient could barely move today. The physical therapist was in a hurry on 05/04/2023, so they did not really get to finish. Someone came in with a displaced fracture or something that he had to deal with. The patient was released with a few pain pills, but she does not have any anymore. She can not take Tylenol because it does not agree with her. The patient was told that she could take ibuprofen. She is taking her pain medication as needed. The patient does not take it that much because it does not help that much. She takes it once a day before she goes to bed. The patient tried taking it after she ran out of the pain pills. It is just the incision site  and the bone pain together. Once the incision heals, she can feel it is getting better. She used to not be able to find one comfortable position anywhere. The patient rates her worst pain as an 8.5 out of 10. On 05/02/2023 or 05/03/2023, it was really bad. The patient could not move her leg at all at first, but now she can move it a little. She has an appointment with the orthopedist in 1 month.    Left leg edema  Her left leg has been swollen and her foot on her left leg has been the circulation is weird. It was swollen and cold. The patient has never broke her leg before. She does not have any compression socks. The patient rides horses. It would be hard for her to get on and off.    Current Outpatient Medications:   •  albuterol sulfate  (90 Base) MCG/ACT inhaler, Inhale 2 puffs Every 4 (Four) Hours As Needed for Wheezing., Disp: 18 g, Rfl: 0  •  aspirin 81 MG EC tablet, Take 1 tablet by mouth., Disp: , Rfl:   •  betamethasone dipropionate 0.05 % cream, , Disp: , Rfl:   •  estradiol (MINIVELLE, VIVELLE-DOT) 0.05 MG/24HR patch, Place 1 patch on the skin as directed by provider 2 (Two) Times a Week., Disp: 24 each, Rfl: 4  •  ibuprofen (ADVIL,MOTRIN) 200 MG tablet, Take 1 tablet by mouth Every 6 (Six) Hours As Needed for Mild Pain., Disp: , Rfl:   •  MAGNESIUM CITRATE PO, Take  by mouth., Disp: , Rfl:   •  mupirocin (BACTROBAN) 2 % ointment, , Disp: , Rfl:   •  Omega-3 Fatty Acids (OMEGA 3 PO), Take  by mouth., Disp: , Rfl:   •  Progesterone (PROMETRIUM) 100 MG capsule, Take 1 capsule by mouth Daily., Disp: 90 capsule, Rfl: 4  •  Elidel 1 % cream, , Disp: , Rfl:           Objective   Vital Signs:   Wt 56.7 kg (125 lb)   BMI 20.80 kg/m²     Physical Exam  Vitals reviewed.   Constitutional:       Appearance: Normal appearance. She is normal weight.   Pulmonary:      Effort: Pulmonary effort is normal.   Musculoskeletal:      Comments: Left hip-she has a 2 inch to 3 inch incision with Steri-Strips on the left  hip with some mild yellowish bruising that is healing well.   Skin:     Coloration: Skin is not pale.   Neurological:      Mental Status: She is alert and oriented to person, place, and time.   Psychiatric:         Mood and Affect: Mood normal.         Behavior: Behavior normal.        Result Review :                   Assessment and Plan    Diagnoses and all orders for this visit:    1. Closed fracture of left hip with routine healing, subsequent encounter (Primary)      I spent 13 minutes caring for Lucero on this date of service. This time includes time spent by me in the following activities:preparing for the visit, reviewing tests, counseling and educating the patient/family/caregiver and documenting information in the medical record    Fracture of left hip  - I advised the patient to take 2 tablets of 200 mg of ibuprofen with food every 6 to 8 hours.  - I advised the patient to contact the office if she experiences any new fevers, cough, shortness of breath, swelling in her legs, or infection.  - I advised the patient to wear generalized compression socks.    BMI is within normal parameters. No other follow-up for BMI required.         Follow Up   Return if symptoms worsen or fail to improve.  Patient was given instructions and counseling regarding her condition or for health maintenance advice. Please see specific information pulled into the AVS if appropriate.     RTC/call  If symptoms worsen  Meds MOA and SE's reviewed and pt v/u    WENDY Cr [facility]  [address] [place] [id number]  Fax [phone]  Phone [phone]    Transcribed from ambient dictation for WENDY Cr by Tiffani Sood, Quality .  05/05/23   18:32 EDT    Patient or patient representative verbalized consent to the visit recording.  I have personally performed the services described in  this document as transcribed by the above individual, and it is both accurate and complete.

## 2023-05-05 NOTE — PROGRESS NOTES
Chief Complaint  Hospital Follow Up Visit    Subjective     {Problem List  Visit Diagnosis   Encounters  Notes  Medications  Labs  Result Review Imaging  Media :23}     Lucero Carrion presents to [facility]  History of Present Illness    This was an audio and video enabled telemedicine encounter.    You have chosen to receive care through a telehealth visit.  Do you consent to use a video/audio connection for your medical care today? Yes        Current Outpatient Medications:   •  albuterol sulfate  (90 Base) MCG/ACT inhaler, Inhale 2 puffs Every 4 (Four) Hours As Needed for Wheezing., Disp: 18 g, Rfl: 0  •  aspirin 81 MG EC tablet, Take 1 tablet by mouth., Disp: , Rfl:   •  betamethasone dipropionate 0.05 % cream, , Disp: , Rfl:   •  estradiol (MINIVELLE, VIVELLE-DOT) 0.05 MG/24HR patch, Place 1 patch on the skin as directed by provider 2 (Two) Times a Week., Disp: 24 each, Rfl: 4  •  ibuprofen (ADVIL,MOTRIN) 200 MG tablet, Take 1 tablet by mouth Every 6 (Six) Hours As Needed for Mild Pain., Disp: , Rfl:   •  MAGNESIUM CITRATE PO, Take  by mouth., Disp: , Rfl:   •  mupirocin (BACTROBAN) 2 % ointment, , Disp: , Rfl:   •  Omega-3 Fatty Acids (OMEGA 3 PO), Take  by mouth., Disp: , Rfl:   •  Progesterone (PROMETRIUM) 100 MG capsule, Take 1 capsule by mouth Daily., Disp: 90 capsule, Rfl: 4  •  Elidel 1 % cream, , Disp: , Rfl:      Review of Systems     Objective   Vital Signs:   Wt 56.7 kg (125 lb)   BMI 20.80 kg/m²     Physical Exam   Result Review :{Labs  Result Review  Imaging  Med Tab  Media :23}   {The following data was reviewed by (Optional):76583}  {Ambulatory Labs (Optional):61263}  {Data reviewed (Optional):08781:::1}          Assessment and Plan {CC Problem List  Visit Diagnosis  ROS  Review (Popup)  Health Maintenance  Quality  BestPractice  Medications  SmartSets  SnapShot Encounters  Media :23}   There are no diagnoses linked to  this encounter.  {Time Spent (Optional):36198}    BMI is within normal parameters. No other follow-up for BMI required.       AWV: ***  A1C:   Lab Results   Component Value Date    HGBA1C 4.8 12/30/2019      ACP: Advance Care Planning   {Advance Directive Status:38813}   Mammogram: ***  Colonoscopy: ***    Follow Up {Instructions Charge Capture  Follow-up Communications :23}  No follow-ups on file.  Patient was given instructions and counseling regarding her condition or for health maintenance advice. Please see specific information pulled into the AVS if appropriate.     RTC/call  If symptoms worsen  Meds MOA and SE's reviewed and pt v/u    WENDY Cr [facility]  [address] [address]  Fax [phone]  Phone [phone]

## 2023-05-08 PROBLEM — S72.002D CLOSED FRACTURE OF LEFT HIP WITH ROUTINE HEALING: Status: ACTIVE | Noted: 2023-05-08

## 2023-05-09 NOTE — PROGRESS NOTES
Chief Complaint  Hospital Follow Up Visit    Subjective          Lucero Carrion presents to [facility]  History of Present Illness     This was an audio and video enabled telemedicine encounter.        Patient is a video visit agreeable to dictation as well as video visit.  Patient had an incident with horse which fractured her left femur she is status postrepair.  Patient has osteoporosis declined treatment and 4/12/2019.      She has no complaints of fever she is having some difficulty moving around due to the pain.   She has followed up with surgery.        Current Outpatient Medications:   •  albuterol sulfate  (90 Base) MCG/ACT inhaler, Inhale 2 puffs Every 4 (Four) Hours As Needed for Wheezing., Disp: 18 g, Rfl: 0  •  aspirin 81 MG EC tablet, Take 1 tablet by mouth., Disp: , Rfl:   •  betamethasone dipropionate 0.05 % cream, , Disp: , Rfl:   •  estradiol (MINIVELLE, VIVELLE-DOT) 0.05 MG/24HR patch, Place 1 patch on the skin as directed by provider 2 (Two) Times a Week., Disp: 24 each, Rfl: 4  •  ibuprofen (ADVIL,MOTRIN) 200 MG tablet, Take 1 tablet by mouth Every 6 (Six) Hours As Needed for Mild Pain., Disp: , Rfl:   •  MAGNESIUM CITRATE PO, Take  by mouth., Disp: , Rfl:   •  mupirocin (BACTROBAN) 2 % ointment, , Disp: , Rfl:   •  Omega-3 Fatty Acids (OMEGA 3 PO), Take  by mouth., Disp: , Rfl:   •  Progesterone (PROMETRIUM) 100 MG capsule, Take 1 capsule by mouth Daily., Disp: 90 capsule, Rfl: 4  •  Elidel 1 % cream, , Disp: , Rfl:         Objective   Vital Signs:   Wt 56.7 kg (125 lb)   BMI 20.80 kg/m²     Physical Exam  Vitals reviewed.   Pulmonary:      Effort: Pulmonary effort is normal.   Musculoskeletal:      Comments: 1-incision vertically over the left hip Tegaderm intact no erythema edema or discharge noted mild bruising   Neurological:      Mental Status: She is alert.   Psychiatric:         Mood and Affect: Mood normal.         Behavior: Behavior  normal.        Result Review :                 Assessment and Plan    Diagnoses and all orders for this visit:    1. Other osteoporosis without current pathological fracture (Primary)    2. Closed fracture of left hip with routine healing, subsequent encounter       She is not agreeable to return to clinic for CSA and UDS.  She does not want to impose on anyone and needs rides.  She does have her  for assistance but reports her not getting along at this time if she does not want to add additional stress.  She did not want to see a counselor or have medication to treat her symptoms.  We reviewed all to alternate Tylenol and Motrin as directed.  Patient will consider if possible to return to clinic for visit for CSA and UDS for further treatment with Ultram.    Addendum message patient regarding need for follow-up physical, mammogram, DEXA bone scan, colonoscopy with plan to complete within the next 2 to 3 months.    I spent 18 minutes caring for Lucero on this date of service. This time includes time spent by me in the following activities:preparing for the visit, reviewing tests, obtaining and/or reviewing a separately obtained history, counseling and educating the patient/family/caregiver and documenting information in the medical record    BMI is within normal parameters. No other follow-up for BMI required.         Follow Up   Return in about 6 weeks (around 6/15/2023) for Annual, fasting.  Patient was given instructions and counseling regarding her condition or for health maintenance advice. Please see specific information pulled into the AVS if appropriate.     RTC/call  If symptoms worsen  Meds MOA and SE's reviewed and pt v/u    WENDY Cr [facility]  [address] [address]  Fax [phone]  Phone [phone]

## 2023-05-24 DIAGNOSIS — Z12.31 BREAST CANCER SCREENING BY MAMMOGRAM: Primary | ICD-10-CM

## 2023-07-31 RX ORDER — ESTRADIOL 0.05 MG/D
PATCH, EXTENDED RELEASE TRANSDERMAL
Qty: 24 PATCH | Refills: 1 | Status: SHIPPED | OUTPATIENT
Start: 2023-07-31

## 2023-07-31 RX ORDER — PROGESTERONE 100 MG/1
CAPSULE ORAL
Qty: 90 CAPSULE | Refills: 4 | Status: SHIPPED | OUTPATIENT
Start: 2023-07-31

## 2023-11-07 ENCOUNTER — HOSPITAL ENCOUNTER (OUTPATIENT)
Dept: MAMMOGRAPHY | Facility: HOSPITAL | Age: 53
Discharge: HOME | End: 2023-11-07
Admitting: NURSE PRACTITIONER
Payer: COMMERCIAL

## 2023-11-07 DIAGNOSIS — Z12.31 BREAST CANCER SCREENING BY MAMMOGRAM: ICD-10-CM

## 2023-11-07 PROCEDURE — 77063 BREAST TOMOSYNTHESIS BI: CPT

## 2023-11-07 PROCEDURE — 77067 SCR MAMMO BI INCL CAD: CPT

## 2023-11-15 ENCOUNTER — OFFICE VISIT (OUTPATIENT)
Dept: OBSTETRICS AND GYNECOLOGY | Facility: CLINIC | Age: 53
End: 2023-11-15
Payer: COMMERCIAL

## 2023-11-15 VITALS
DIASTOLIC BLOOD PRESSURE: 70 MMHG | SYSTOLIC BLOOD PRESSURE: 114 MMHG | WEIGHT: 133 LBS | RESPIRATION RATE: 16 BRPM | BODY MASS INDEX: 22.13 KG/M2

## 2023-11-15 DIAGNOSIS — Z79.890 HORMONE REPLACEMENT THERAPY (HRT): ICD-10-CM

## 2023-11-15 DIAGNOSIS — Z01.419 WELL WOMAN EXAM WITH ROUTINE GYNECOLOGICAL EXAM: Primary | ICD-10-CM

## 2023-11-15 DIAGNOSIS — M85.80 LOW BONE MASS: ICD-10-CM

## 2023-11-15 DIAGNOSIS — Z01.419 ENCOUNTER FOR WELL WOMAN EXAM WITH ROUTINE GYNECOLOGICAL EXAM: ICD-10-CM

## 2023-11-15 RX ORDER — PEN NEEDLE, DIABETIC 32GX 5/32"
NEEDLE, DISPOSABLE MISCELLANEOUS
COMMUNITY
Start: 2023-09-05

## 2023-11-15 RX ORDER — TERIPARATIDE 250 UG/ML
20 INJECTION, SOLUTION SUBCUTANEOUS DAILY
COMMUNITY
Start: 2023-09-05

## 2023-11-15 NOTE — PROGRESS NOTES
Subjective   Chief Complaint   Patient presents with    Annual Exam     Lucero Carrion is a 53 y.o. year old  menopausal female presenting to be seen for her annual exam.  This past year she has not been on hormone replacement therapy.  There has not been vaginal bleeding in the last 12 months.  Menopausal symptoms are present (night sweats) and are significantly affecting her quality of life. This is not as bad as it had been.     SEXUAL Hx:  She is not currently sexually active.  In the past year there she has not been sexually active.    Condoms are not needed because she is not sexually active.  She would not like to be screened for STD's at today's exam.  [place] is painful: n/a  HEALTH Hx:  She exercises regularly: yes.  She wears her seat belt: yes.  She has concerns about domestic violence: no.    OTHER THINGS SHE WANTS TO DISCUSS TODAY:  Nothing else    The following portions of the patient's history were reviewed and updated as appropriate:problem list, current medications, allergies, past family history, past medical history, past social history, and past surgical history.    Social History    Tobacco Use      Smoking status: Former        Packs/day: 0.25        Years: 10.00        Additional pack years: 0.00        Total pack years: 2.50        Types: Cigarettes        Quit date: 2009        Years since quittin.8      Smokeless tobacco: Never      Review of Systems  Constitutional POS: nothing reported    NEG: anorexia or night sweats   Genitourinary POS: nothing reported    NEG: dysuria or hematuria      Gastointestinal POS: nothing reported    NEG: bloating, change in bowel habits, melena, or reflux symptoms   Integument POS: nothing reported    NEG: moles that are changing in size, shape, color or rashes   Breast POS: nothing reported    NEG: persistent breast lump, skin dimpling, or nipple discharge        Objective   /70   Resp 16   Wt 60.3 kg (133 lb)   LMP  (LMP Unknown)  Comment: LMP: Nov 2016  BMI 22.13 kg/m²     General:  well developed; well nourished  no acute distress   Skin:  No suspicious lesions seen   Thyroid: normal to inspection and palpation   Breasts:  Examined in supine position  Symmetric without masses or skin dimpling  Nipples normal without inversion, lesions or discharge  There are no palpable axillary nodes  Fibrocystic changes are present both breasts without a discrete mass   Abdomen: soft, non-tender; no masses  no umbilical or inguinal hernias are present  no hepato-splenomegaly   Pelvis: Clinical staff was present for exam  External genitalia:  normal appearance of the external genitalia including Bartholin's and Bendon's glands.  :  urethral meatus normal;  Vaginal:  mild atrophy present  Cervix:  normal appearance.  Uterus:  normal size, shape and consistency.  Adnexa:  normal bimanual exam of the adnexa.  Rectal:  digital rectal exam not performed; anus visually normal appearing.        Assessment   Normal GYN exam in menopause  She is up to date on all relevant gynecologic and colorectal screenings  Menopausal female currently on HRT - with significant symptoms affecting activities of daily living  Low bone mass meeting criteria for treatment - followed at  on [address]     Plan   Pap was not done today.  I explained to Lucero that the recommendations for Pap smear interval in a low risk patient has lengthened to 3 years time.  I told Lucero she still needs to be seen in our office yearly for a full physical including breast and pelvic exam.  She was encouraged to get yearly mammograms.  She should report any palpable breast lump(s) or skin changes regardless of mammographic findings.  I explained to Lucero that notification regarding her mammogram results will come from the center performing the study.  Our office will not be routinely calling with mammogram results.  It is her responsibility to make sure that the results from the mammogram are  communicated to her by the breast center.  If she has any questions about the results, she is welcome to call our office anytime.  Colon cancer screening UTD   HEMALATHA FRANK and followed by [facility] on [address]  Reviewed the importance of exercise 2-3 times per week with at least 20-30 minutes of cardio  The importance of keeping all planned follow-up and taking all medications as prescribed was emphasized.  Follow up for annual exam in ~ one year    No orders of the defined types were placed in this encounter.         This note was electronically signed.    Bonnie Carlson MD  November 15, 2023

## 2023-11-17 LAB — REF LAB TEST METHOD: NORMAL

## 2024-01-18 NOTE — TELEPHONE ENCOUNTER
Patient returned call and was warm transferred to Triage.  Patient stated that she does need a refill of both the estradiol and progesterone. Confirmed [facility] pharmacy on [facility] which is on file.

## 2024-01-19 RX ORDER — ESTRADIOL 0.05 MG/D
PATCH, EXTENDED RELEASE TRANSDERMAL
Qty: 24 PATCH | Refills: 1 | OUTPATIENT
Start: 2024-01-19

## 2024-01-19 RX ORDER — ESTRADIOL 0.05 MG/D
1 PATCH, EXTENDED RELEASE TRANSDERMAL 2 TIMES WEEKLY
Qty: 24 PATCH | Refills: 2 | Status: SHIPPED | OUTPATIENT
Start: 2024-01-22

## 2024-01-19 RX ORDER — PROGESTERONE 100 MG/1
100 CAPSULE ORAL DAILY
Qty: 90 CAPSULE | Refills: 2 | Status: SHIPPED | OUTPATIENT
Start: 2024-01-19

## 2024-10-18 ENCOUNTER — TELEPHONE (OUTPATIENT)
Dept: OBSTETRICS AND GYNECOLOGY | Facility: CLINIC | Age: 54
End: 2024-10-18
Payer: COMMERCIAL

## 2024-10-18 RX ORDER — ESTRADIOL 0.05 MG/D
PATCH, EXTENDED RELEASE TRANSDERMAL
Qty: 16 PATCH | Refills: 0 | Status: SHIPPED | OUTPATIENT
Start: 2024-10-18 | End: 2024-12-17

## 2024-10-18 NOTE — TELEPHONE ENCOUNTER
Called and spoke with patient, she has been informed about refill.  She states that she is taking the Prometrium still, but does not need refills.   
Called attempting to reach patient; no answer, LVM requesting call back and provided office call back number.  
Can we confirm she is also taking her Prometrium and if she needs a refill for that as well?    New Medications Ordered This Visit   Medications    estradiol (MINIVELLE, VIVELLE-DOT) 0.05 MG/24HR patch     Sig: APPLY 1 PATCH 2 TIMES A WEEK     Dispense:  16 patch     Refill:  0     Thanks, Bonnie Carlson MD    
(3) no apparent problem

## 2024-10-18 NOTE — TELEPHONE ENCOUNTER
PT IS RETURNING A CALL AND WOULD LIKE A CALL BACK ABOUT MED REFILL ON   estradiol (MINIVELLE, VIVELLE-DOT) 0.05 MG/24HR patch

## 2024-11-18 ENCOUNTER — OFFICE VISIT (OUTPATIENT)
Dept: OBSTETRICS AND GYNECOLOGY | Facility: CLINIC | Age: 54
End: 2024-11-18
Payer: COMMERCIAL

## 2024-11-18 VITALS
DIASTOLIC BLOOD PRESSURE: 80 MMHG | WEIGHT: 131 LBS | HEIGHT: 65 IN | BODY MASS INDEX: 21.83 KG/M2 | RESPIRATION RATE: 16 BRPM | SYSTOLIC BLOOD PRESSURE: 118 MMHG

## 2024-11-18 DIAGNOSIS — Z01.419 WELL WOMAN EXAM: Primary | ICD-10-CM

## 2024-11-18 DIAGNOSIS — N95.1 SYMPTOMATIC MENOPAUSAL OR FEMALE CLIMACTERIC STATES: ICD-10-CM

## 2024-11-18 DIAGNOSIS — Z12.31 SCREENING MAMMOGRAM FOR BREAST CANCER: ICD-10-CM

## 2024-11-18 PROCEDURE — 99396 PREV VISIT EST AGE 40-64: CPT

## 2024-11-18 PROCEDURE — 99459 PELVIC EXAMINATION: CPT

## 2024-11-18 RX ORDER — ESTRADIOL 0.05 MG/D
1 PATCH, EXTENDED RELEASE TRANSDERMAL 2 TIMES WEEKLY
Qty: 24 PATCH | Refills: 4 | Status: SHIPPED | OUTPATIENT
Start: 2024-11-18 | End: 2026-01-12

## 2024-11-18 RX ORDER — PROGESTERONE 100 MG/1
100 CAPSULE ORAL DAILY
Qty: 90 CAPSULE | Refills: 4 | Status: SHIPPED | OUTPATIENT
Start: 2024-11-18
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
## 2025-02-19 ENCOUNTER — TELEPHONE (OUTPATIENT)
Dept: INTERNAL MEDICINE | Facility: CLINIC | Age: 55
End: 2025-02-19
Payer: COMMERCIAL

## 2025-02-19 NOTE — TELEPHONE ENCOUNTER
Call the patient please. This referral will have to wait until Jaqueline Melchor is back in the office.  Patient has not been seen in the office since June 2022.  Please forward the message to Jaqueline after speaking with the patient

## 2025-02-19 NOTE — TELEPHONE ENCOUNTER
Patient called requesting referral to Urology. Patient was seen in [facility] ER for Kidney Stone and Kidney infection. The referral the ED placed was sent to an office that does not accept the patient's insurance.

## 2025-02-26 ENCOUNTER — READMISSION MANAGEMENT (OUTPATIENT)
Dept: CALL CENTER | Facility: HOSPITAL | Age: 55
End: 2025-02-26
Payer: COMMERCIAL

## 2025-02-26 NOTE — OUTREACH NOTE
Prep Survey      Flowsheet Row Responses   [facility] facility patient discharged from? Non-BH   Is LACE score < 7 ? Non- Discharge   Eligibility [facility]   Date of Admission 02/21/25   Date of Discharge 02/26/25   Discharge Disposition Home or Self Care   Discharge diagnosis Left ureteral calculus/ZANDRA   Does the patient have one of the following disease processes/diagnoses(primary or secondary)? Other   Does the patient have Home health ordered? No   Prep survey completed? Yes            MADDY MORALES - Registered Nurse

## 2025-02-27 ENCOUNTER — TRANSITIONAL CARE MANAGEMENT TELEPHONE ENCOUNTER (OUTPATIENT)
Dept: CALL CENTER | Facility: HOSPITAL | Age: 55
End: 2025-02-27
Payer: COMMERCIAL

## 2025-02-27 NOTE — OUTREACH NOTE
Call Center TCM Note      Flowsheet Row Responses   [facility] patient discharged from? Non-BH   Does the patient have one of the following disease processes/diagnoses(primary or secondary)? Other   TCM attempt successful? No   Unsuccessful attempts Attempt 2   Call Status Left message            Annalee Cintron RN    2/27/2025, 16:30 EST

## 2025-02-27 NOTE — OUTREACH NOTE
Call Center TCM Note      Flowsheet Row Responses   [facility] patient discharged from? Non-   Does the patient have one of the following disease processes/diagnoses(primary or secondary)? Other   TCM attempt successful? No   Unsuccessful attempts Attempt 1   Call Status Left message            Annalee Cintron RN    2/27/2025, 14:57 EST

## 2025-02-28 ENCOUNTER — TRANSITIONAL CARE MANAGEMENT TELEPHONE ENCOUNTER (OUTPATIENT)
Dept: CALL CENTER | Facility: HOSPITAL | Age: 55
End: 2025-02-28
Payer: COMMERCIAL

## 2025-02-28 NOTE — OUTREACH NOTE
Call Center TCM Note      Flowsheet Row Responses   [facility] facility patient discharged from? Non-  [[facility]]   Does the patient have one of the following disease processes/diagnoses(primary or secondary)? Other   TCM attempt successful? No   Unsuccessful attempts Attempt 3            Nakita Arreaga RN    2/28/2025, 14:57 EST

## 2025-03-18 ENCOUNTER — OFFICE VISIT (OUTPATIENT)
Dept: INTERNAL MEDICINE | Facility: CLINIC | Age: 55
End: 2025-03-18
Payer: COMMERCIAL

## 2025-03-18 VITALS
TEMPERATURE: 97.5 F | DIASTOLIC BLOOD PRESSURE: 80 MMHG | BODY MASS INDEX: 21.16 KG/M2 | HEIGHT: 65 IN | OXYGEN SATURATION: 97 % | RESPIRATION RATE: 18 BRPM | SYSTOLIC BLOOD PRESSURE: 170 MMHG | HEART RATE: 77 BPM | WEIGHT: 127 LBS

## 2025-03-18 DIAGNOSIS — F41.9 ANXIETY: ICD-10-CM

## 2025-03-18 DIAGNOSIS — I10 PRIMARY HYPERTENSION: Primary | ICD-10-CM

## 2025-03-18 PROCEDURE — 99214 OFFICE O/P EST MOD 30 MIN: CPT | Performed by: NURSE PRACTITIONER

## 2025-03-18 PROCEDURE — 1126F AMNT PAIN NOTED NONE PRSNT: CPT | Performed by: NURSE PRACTITIONER

## 2025-03-18 RX ORDER — ALPRAZOLAM 0.25 MG
0.25 TABLET ORAL 2 TIMES DAILY PRN
Qty: 10 TABLET | Refills: 0 | Status: SHIPPED | OUTPATIENT
Start: 2025-03-18

## 2025-03-18 RX ORDER — AMLODIPINE BESYLATE 5 MG/1
5 TABLET ORAL DAILY
Qty: 30 TABLET | Refills: 1 | Status: SHIPPED | OUTPATIENT
Start: 2025-03-18

## 2025-03-18 RX ORDER — BUSPIRONE HYDROCHLORIDE 5 MG/1
5 TABLET ORAL 3 TIMES DAILY
Qty: 90 TABLET | Refills: 1 | Status: SHIPPED | OUTPATIENT
Start: 2025-03-18

## 2025-03-18 NOTE — PROGRESS NOTES
Chief Complaint  Nephrolithiasis (Hosp. Follow up. )    Subjective          Lucero Carrion presents to [facility]  History of Present Illness  History of Present Illness  The patient presents for a hospital follow-up.    She was recently hospitalized due to a left kidney stone, which was her first experience with this condition. The pain was so severe that it incapacitated her, leading to three emergency room visits. It took over two weeks for the stone to pass naturally. During her hospital stay, she received fluid therapy but no surgical interventions were performed. A repeat CT scan revealed that the stone was approximately an inch from entering her bladder. She was discharged on 02/26/2025 with a prescription for antibiotics to be taken for four days and Flomax, along with a strainer. She successfully collected the stone and submitted it to her urologist for analysis. She reports no current symptoms such as flank pain, burning sensation, or hematuria. She has not scheduled a follow-up appointment with her urologist as per his advice.    She has not been monitoring her blood pressure at home. She was prescribed amlodipine during her hospital stay but did not take it due to her kidney stone. She acknowledges that her blood pressure is currently elevated. She reports no chest pain, shortness of breath, leg swelling, or palpitations. She occasionally experiences headaches but does not have any visual changes or dizziness associated with her high blood pressure. She maintains a healthy diet, avoiding processed foods.    She attributes her elevated blood pressure to stress, citing personal issues including an impending divorce and relocation to [place]. She is seeking medication to manage her stress. She has previously been prescribed Prozac, which she discontinued due to adverse effects. She has also taken Xanax in the past during periods of high stress. She has been  "receiving counseling for the past two years and is open to a referral to psychiatry. She reports no history of sleep disturbances.    MEDICATIONS  Current: Amlodipine, buspirone, Xanax (as needed)    Objective   Vital Signs:   /80 (BP Location: Right arm, Patient Position: Sitting, Cuff Size: Adult)   Pulse 77   Temp 97.5 °F (36.4 °C) (Infrared)   Resp 18   Ht 165.1 cm (65\")   Wt 57.6 kg (127 lb)   SpO2 97%   BMI 21.13 kg/m²     Physical Exam  Vitals and nursing note reviewed.   Constitutional:       General: She is not in acute distress.     Appearance: Normal appearance. She is well-developed. She is not ill-appearing.   HENT:      Head: Normocephalic and atraumatic.   Eyes:      General: No scleral icterus.  Neck:      Thyroid: No thyromegaly.   Cardiovascular:      Rate and Rhythm: Normal rate and regular rhythm.   Pulmonary:      Effort: Pulmonary effort is normal.      Breath sounds: Normal breath sounds.   Abdominal:      General: Bowel sounds are normal. There is no distension.      Palpations: Abdomen is soft.      Tenderness: There is no abdominal tenderness.   Lymphadenopathy:      Cervical: No cervical adenopathy.   Skin:     Capillary Refill: Capillary refill takes 2 to 3 seconds.      Coloration: Skin is not pale.   Neurological:      Mental Status: She is alert and oriented to person, place, and time.   Psychiatric:         Mood and Affect: Mood normal.         Behavior: Behavior normal.        Result Review :                 Assessment and Plan    Diagnoses and all orders for this visit:    1. Primary hypertension (Primary)  -     amLODIPine (NORVASC) 5 MG tablet; Take 1 tablet by mouth Daily.  Dispense: 30 tablet; Refill: 1    2. Anxiety  -     busPIRone (BUSPAR) 5 MG tablet; Take 1 tablet by mouth 3 (Three) Times a Day.  Dispense: 90 tablet; Refill: 1  -     Ambulatory Referral to Psychiatry  -     ToxAssure Flex 23, Ur -; Future  -     ALPRAZolam (Xanax) 0.25 MG tablet; Take 1 tablet " by mouth 2 (Two) Times a Day As Needed for Anxiety.  Dispense: 10 tablet; Refill: 0  -     ToxAssure Flex 23, Ur -      Assessment & Plan  1. Hypertension.  Her blood pressure is significantly elevated at 170/80, posing a risk for stroke. She has not been checking her blood pressure at home but plans to use a friend's cuff. She reports no chest pain, shortness of breath, leg swelling, or palpitations. She does feel flushed and has occasional headaches. A prescription for amlodipine has been issued, and she is advised to commence the medication immediately, with a subsequent dose to be taken the following day to establish a morning dosing schedule. Potential side effects, including lightheadedness, were discussed.    2. Anxiety.  She reports significant stress due to personal circumstances, including an impending divorce and relocation. She has been in counseling for 2 years. A prescription for buspirone has been issued to manage her anxiety. A referral to psychiatry has also been made for further evaluation and management. A urine drug screen will be conducted, and a controlled substance agreement will be initiated. A prescription for Xanax, consisting of 10 tablets, has been provided to bridge the gap until her psychiatry appointment.    3. Kidney stone.  She recently passed a kidney stone and was treated with fluids and pain management in the hospital. She was discharged on 02/26/2025 with a prescription for antibiotics and Flomax. She reports no current symptoms such as flank pain, burning, or blood in the urine. The stone was collected and sent for analysis to guide future treatment.    Follow-up  The patient will follow up within the next month.      Follow Up   Return in about 1 month (around 4/18/2025) for Recheck.  Patient was given instructions and counseling regarding her condition or for health maintenance advice. Please see specific information pulled into the AVS if appropriate.     RTC/call  If  symptoms worsen  Meds MOA and SE's reviewed and pt v/u    03/18/25   15:49 EDT    Patient or patient representative verbalized consent to the visit recording.  I have personally performed the services described in this document as transcribed by the above individual, and it is both accurate and complete.

## 2025-03-22 LAB
1OH-MIDAZOLAM UR QL SCN: NOT DETECTED NG/MG CREAT
6MAM UR QL SCN: NEGATIVE NG/ML
7AMINOCLONAZEPAM/CREAT UR: NOT DETECTED NG/MG CREAT
A-OH ALPRAZ/CREAT UR: NOT DETECTED NG/MG CREAT
A-OH-TRIAZOLAM/CREAT UR CFM: NOT DETECTED NG/MG CREAT
ACP UR QL CFM: NOT DETECTED
ALPRAZ/CREAT UR CFM: NOT DETECTED NG/MG CREAT
AMPHETAMINES UR QL SCN: NEGATIVE NG/ML
APAP UR QL SCN: NEGATIVE UG/ML
BARBITURATES UR QL SCN: NEGATIVE NG/ML
BENZODIAZ SCN METH UR: NEGATIVE
BUPRENORPHINE UR QL SCN: NEGATIVE
BUPRENORPHINE/CREAT UR: NOT DETECTED NG/MG CREAT
CANNABINOIDS UR QL SCN: NEGATIVE NG/ML
CARISOPRODOL UR QL: NEGATIVE NG/ML
CLONAZEPAM/CREAT UR CFM: NOT DETECTED NG/MG CREAT
COCAINE+BZE UR QL SCN: NEGATIVE NG/ML
CREAT UR-MCNC: 33 MG/DL
D-METHORPHAN UR-MCNC: NOT DETECTED NG/ML
D-METHORPHAN+LEVORPHANOL UR QL: NOT DETECTED
DESALKYLFLURAZ/CREAT UR: NOT DETECTED NG/MG CREAT
DIAZEPAM/CREAT UR: NOT DETECTED NG/MG CREAT
ETG ETS UR QL CFM: NORMAL
ETHANOL UR QL SCN: NEGATIVE G/DL
ETHANOL UR QL SCN: NORMAL NG/ML
ETHYL GLUCURONIDE UR CFM-MCNC: 1967 NG/MG CREAT
ETHYL SULFATE UR CFM-MCNC: NOT DETECTED NG/MG CREAT
FENTANYL CTO UR SCN-MCNC: NEGATIVE NG/ML
FENTANYL/CREAT UR: NOT DETECTED NG/MG CREAT
FLUNITRAZEPAM UR QL SCN: NOT DETECTED NG/MG CREAT
GABAPENTIN UR-MCNC: NEGATIVE UG/ML
HALLUCINOGENS UR: NEGATIVE
HYPNOTICS UR QL SCN: NEGATIVE
KETAMINE UR QL: NOT DETECTED
LORAZEPAM/CREAT UR: NOT DETECTED NG/MG CREAT
MEPERIDINE UR QL SCN: NEGATIVE NG/ML
METHADONE UR QL SCN: NEGATIVE NG/ML
METHADONE+METAB UR QL SCN: NEGATIVE NG/ML
MIDAZOLAM/CREAT UR CFM: NOT DETECTED NG/MG CREAT
MISCELLANEOUS, UR: NEGATIVE
NORBUPRENORPHINE/CREAT UR: NOT DETECTED NG/MG CREAT
NORDIAZEPAM/CREAT UR: NOT DETECTED NG/MG CREAT
NORFENTANYL/CREAT UR: NOT DETECTED NG/MG CREAT
NORFLUNITRAZEPAM UR-MCNC: NOT DETECTED NG/MG CREAT
NORKETAMINE UR-MCNC: NOT DETECTED UG/ML
OPIATES UR SCN-MCNC: NEGATIVE NG/ML
OXAZEPAM/CREAT UR: NOT DETECTED NG/MG CREAT
OXYCODONE CTO UR SCN-MCNC: NEGATIVE NG/ML
PCP UR QL SCN: NEGATIVE NG/ML
PRESCRIBED MEDICATIONS: NORMAL
PROPOXYPH UR QL SCN: NEGATIVE NG/ML
TAPENTADOL CTO UR SCN-MCNC: NEGATIVE NG/ML
TEMAZEPAM/CREAT UR: NOT DETECTED NG/MG CREAT
TRAMADOL UR QL SCN: NEGATIVE NG/ML
ZALEPLON UR-MCNC: NOT DETECTED NG/ML
ZOLPIDEM PHENYL-4-CARB UR QL SCN: NOT DETECTED
ZOLPIDEM UR QL SCN: NOT DETECTED
ZOPICLONE-N-OXIDE UR-MCNC: NOT DETECTED NG/ML

## 2025-03-23 ENCOUNTER — RESULTS FOLLOW-UP (OUTPATIENT)
Dept: INTERNAL MEDICINE | Facility: CLINIC | Age: 55
End: 2025-03-23
Payer: COMMERCIAL

## 2025-03-26 ENCOUNTER — TELEPHONE (OUTPATIENT)
Dept: INTERNAL MEDICINE | Facility: CLINIC | Age: 55
End: 2025-03-26
Payer: COMMERCIAL

## 2025-03-26 NOTE — TELEPHONE ENCOUNTER
Attempted to call patient , left voice message for patient to return call.     RELAY:  Please call patient to let her know UDS was positive for alcohol.  She was prescribed BZD at her last visit due to acute anxiety.  Please let patient know she should not consume alcohol when taking BZD.

## 2025-04-01 ENCOUNTER — TELEMEDICINE (OUTPATIENT)
Age: 55
End: 2025-04-01
Payer: COMMERCIAL

## 2025-04-01 DIAGNOSIS — F43.23 ADJUSTMENT DISORDER WITH MIXED ANXIETY AND DEPRESSED MOOD: Primary | ICD-10-CM

## 2025-04-01 RX ORDER — SERTRALINE HYDROCHLORIDE 25 MG/1
25 TABLET, FILM COATED ORAL DAILY
Qty: 30 TABLET | Refills: 2 | Status: SHIPPED | OUTPATIENT
Start: 2025-04-01 | End: 2026-04-01

## 2025-04-01 NOTE — PROGRESS NOTES
"    New Patient Office Visit      Date: 2025  Patient Name: Lucero Carrion  : 1970   MRN: [id number]     Referring Provider: Tiera Melchor APRN    Chief Complaint:      ICD-10-CM ICD-9-CM   1. Adjustment disorder with mixed anxiety and depressed mood  F43.23 309.28          Mode of Visit: Video  Location of patient: -HOME-  Location of provider: +HOME+  You have chosen to receive care through a telehealth visit.  The patient has signed the video visit consent form.  The visit included audio and video interaction. No technical issues occurred during this visit.      History of Present Illness:   Lucero Carrion is a 55 y.o. female who is here today to establish care. This is the patient's initial encounter with this provider.     History of Present Illness  The patient presents for evaluation of anxiety and depression.    She was referred to behavioral health due to significant stress in her life, which has been exacerbated by a recent kidney stone episode. This incident required multiple emergency room visits and eventual hospitalization. She is currently undergoing a divorce process, planning a relocation to [place] in 2025, and managing a horse farm, all of which are contributing to her heightened stress levels. She has been  since  and has been breeding horses for 30 years. She reports experiencing normal human emotions, such as crying when one of her horses  last year. However, she describes her current emotional state as being \"over the top\" due to the impending move, divorce and the physical strain from the kidney stone episode. She typically manages stress through resourceful means, including horse riding, outdoor activities, and maintaining a healthy lifestyle. However, these coping mechanisms have been disrupted recently. She experiences physical symptoms of anxiety, such as a sensation of her head spinning, but does not believe she has had a full-blown panic attack. Her anxiety " does not manifest as anger or outbursts, but rather as frustration, often leading to tears. She is very physically active and usually tired at night. She has been having trouble sleeping due to inactivity and stress. If she falls asleep, she wakes up sometimes and can usually get back to sleep, but it takes a while. She does not sleep as deeply and feels tired when she wakes up. She gets about 6 to 8 hours of sleep off and on even if it is not good sleep. She does not engage in risky behaviors such as gambling, excessive spending, or substance use. She also does not experience an exaggerated sense of well-being. She rates her current anxiety level as 6 or 7 out of 10, attributing this to a recent conversation with her ex- about their impending move. She tends to isolate herself during depressive episodes, but maintains her self-care routine and responsibilities towards her animals. She does not experience feelings of guilt or worthlessness, but rather feels overwhelmed by her responsibilities. She does not endorse any suicidal or homicidal ideation, self-harm thoughts, or hallucinations. She also reports that she has never experienced high blood pressure before, which she attributes to her current stress levels.      Current Medications for MHI:    Buspar 5mg PO BID  Xanax 0.25mg PO PRN     Current Treatments/Therapy for MHI:   Currently in therapy      Subjective      Review of Systems:     Denies seizure, focal weakness, changes in vision, paresthesia’s, or numbness, headache, and neck stiffness  Denies cough, sputum, wheezing, hemoptysis. Reports asthma and occasional shortness of breath  Denies chest pain, palpitations, orthopnea   Denies abdominal pain, nausea, vomiting, diarrhea, and constipation   Denies dysuria, urgency, changes in frequency and hematuria   Denies fever and chills   Denies skin rash, hair loss, and edema   Denies ecchymoses and bleeding   Denies heat or cold intolerance, polydipsia,  and polyuria  Denies abnormal movements, tics, and tremors  Denies weight gain/loss    Depression Screening:  Patient screened positive for depression based on a PHQ-9 score of  on . Follow-up recommendations include: Prescribed antidepressant medication treatment.       SUICIDE RISK ASSESSMENT/CSSRS:  1. Does client have thoughts /of suicide? no  2. Does client have intent for suicide? no  3. Does client have a current plan for suicide? no  4. History of suicide attempts: no  5. Family history of suicide or attempts: no  6. History of violent behaviors towards others or property or thoughts of committing suicide: no  7. History of sexual aggression toward others: no  8. Access to firearms or weapons: no    Past Psychiatric History:   History of outpatient psychiatrist: no  Diagnoses: Denies  History of outpatient therapy: yes  Previous Inpatient hospitalizations: no  Previous medication trials: Prozac (1 week)  History of suicide/self harm attempts: no    Review of Psychiatric Systems:  Mood (depression, jj/hypomania): Depressive symptoms,  Psychosis/thought disturbances: Denies  Anxiety/panic: Increased anxiety  Obsessions and compulsions: Denies  Abuse (verbal/physical/sexual) /Trauma: Adult Verbal and Childhood Verbal  Dissociation: Denies  Somatic concerns: Denies  Appetite: decreased  Sleep pattern: 6-8 hours of consistent sleep per night. Difficulty staying asleep  Personality disorders: Denies    Abuse/trauma History:              Physical: no              Sexual: no              Emotional/Neglect: yes              Significant death/loss: multiple close friends, horse of 27 years              Other trauma:  parents              Head Injury/Seizures:no  Triggers: (Persons/Places/Things/Events/Thought/Emotions): not being heard    Substance Abuse History/Last use:              Alcohol: yes Occasional              Tobacco/Vape: no               Illicit Drugs: no               Caffeine: yes  Occasional              Seizures:no    Obstetrics:   LMP: HRT    Legal History:   No legal history noted today.      Educational and Occupational History:               Highest level of education obtained: college- Masters Degree               History? no              Patient's Occupation: full time- self employed, horse farm    Interpersonal/Relational:              Marital Status:               Support system: good support system      Social History:  Where was patient born: [place]   Upbringing: Mother and Father joint custody   Where does patient currently live: [place], [place]  Living situation: currently moving   Leisure and Recreation: horses, yoga  Denominational: Denies  Developmental history: All milestones met yes    Family History:   Family History   Problem Relation Age of Onset    Asthma Father          at age 60    Osteoporosis Mother     Breast cancer Neg Hx     Ovarian cancer Neg Hx     Uterine cancer Neg Hx     Colon cancer Neg Hx     Liver cancer Neg Hx     Liver disease Neg Hx     Pancreatic cancer Neg Hx        Family Psychiatric History:   Psych Diagnosis: Denies  History of suicide/self harm attempts: Denies   History of Substance abuse:   Father- alcohol abuse     Past Medical History:   Past Medical History:   Diagnosis Date    Arthritis     RHEUMATOID    Arthritis     Asthma     Hepatitis C     Hepatitis C 2022    Other osteoporosis without current pathological fracture 2019    PMS (premenstrual syndrome)     Well woman exam 2019       Past Surgical History:   Past Surgical History:   Procedure Laterality Date    FINGER FRACTURE SURGERY Left     HIP FRACTURE SURGERY Left 2023    WISDOM TOOTH EXTRACTION         Medications:     Current Outpatient Medications:     albuterol sulfate  (90 Base) MCG/ACT inhaler, Inhale 2 puffs Every 4 (Four) Hours As Needed for Wheezing., Disp: 18 g, Rfl: 0    ALPRAZolam (Xanax) 0.25 MG tablet, Take 1 tablet by  mouth 2 (Two) Times a Day As Needed for Anxiety., Disp: 10 tablet, Rfl: 0    amLODIPine (NORVASC) 5 MG tablet, Take 1 tablet by mouth Daily., Disp: 30 tablet, Rfl: 1    busPIRone (BUSPAR) 5 MG tablet, Take 1 tablet by mouth 3 (Three) Times a Day., Disp: 90 tablet, Rfl: 1    estradiol (MINIVELLE, VIVELLE-DOT) 0.05 MG/24HR patch, Place 1 patch on the skin as directed by provider 2 (Two) Times a Week., Disp: 24 patch, Rfl: 4    MAGNESIUM CITRATE PO, Take  by mouth., Disp: , Rfl:     Omega-3 Fatty Acids (OMEGA 3 PO), Take  by mouth., Disp: , Rfl:     Progesterone (PROMETRIUM) 100 MG capsule, Take 1 capsule by mouth Daily., Disp: 90 capsule, Rfl: 4    sertraline (Zoloft) 25 MG tablet, Take 1 tablet by mouth Daily., Disp: 30 tablet, Rfl: 2    Medication Considerations:  DINAH reviewed and appropriate.      Herbals and supplements: Mg, Vit D with K, Fish oil, SAMe, B Vit     Allergies:   No Known Allergies    Objective     Physical Exam:  Vital Signs: There were no vitals filed for this visit.  There is no height or weight on file to calculate BMI.   The patient was seen remotely today via a MyChart Video Visit through [facility].  Unable to obtain vital signs due to nature of remote visit.  Height stated at  5'5 inches.  Weight stated at 125 pounds.    Mental Status Exam:   MENTAL STATUS EXAM   General Appearance:  Cleanly groomed and dressed and well developed  Eye Contact:  Good eye contact  Attitude:  Cooperative  Motor Activity:  Other  Other Comment:  ZENY Video visit  Muscle Strength:  Other  Other Comment:  ZENY Video visit  Speech:  Normal rate, tone, volume  Language:  Spontaneous  Mood and affect:  Other  Other Comment:  Stressed  Hopelessness:  Denies  Loneliness: 10  Thought Process:  Logical  Associations/ Thought Content:  No delusions  Hallucinations:  None  Suicidal Ideations:  Not present  Homicidal Ideation:  Not present  Sensorium:  Alert and clear  Orientation:  Person, place, time and situation  Immediate  "Recall, Recent, and Remote Memory:  Intact  Attention Span/ Concentration:  Good  Fund of Knowledge:  Appropriate for age and educational level  Intellectual Functioning:  Average range  Insight:  Good  Judgement:  Good  Reliability:  Good  Impulse Control:  Good       @RESULASTCBCDIFFPANEL,TSH,LABLIPI,TFYOZLOC21,VHGJQMXH88,MG,FOLATE,PROLACTIN,CRPRESULT,CMP,[id number])@    Lab Results   Component Value Date    GLUCOSE 94 10/20/2022    BUN 11 10/20/2022    CREATININE 0.89 10/20/2022    EGFR 77.6 10/20/2022    BCR 12.4 10/20/2022    K 4.3 10/20/2022    CO2 25.3 10/20/2022    CALCIUM 9.8 10/20/2022    ALBUMIN 4.40 10/20/2022    BILITOT 0.4 10/20/2022    AST 22 10/20/2022    ALT 11 10/20/2022       Lab Results   Component Value Date    WBC 5.07 06/14/2022    HGB 15.0 06/14/2022    HCT 44.7 06/14/2022    MCV 89.2 06/14/2022     06/14/2022       Lab Results   Component Value Date    CHOL 207 (H) 06/02/2022    TRIG 64 06/02/2022    HDL 81 (H) 06/02/2022     (H) 06/02/2022       The following data was reviewed by: WENDY Lopez on 04/01/2025:         Assessment / Plan      Visit Diagnosis/Orders Placed This Visit:  Diagnoses and all orders for this visit:    1. Adjustment disorder with mixed anxiety and depressed mood (Primary)  -     sertraline (Zoloft) 25 MG tablet; Take 1 tablet by mouth Daily.  Dispense: 30 tablet; Refill: 2          Assessment & Plan  1. Anxiety.  Her anxiety appears to be primarily driven by external stressors, including a recent kidney stone, impending divorce, and relocation. She reports physical symptoms such as feeling \"lit up\" and difficulty sleeping due to inactivity and stress. She is advised to resume her yoga practice and other physical activities to help manage her anxiety. A follow-up appointment will be scheduled to assess the effectiveness of these interventions.    2. Depression.  Her depressive symptoms seem to be secondary to her anxiety and current life " stressors. She does not report any suicidal ideation, self-harm, or hallucinations. She is encouraged to maintain her daily routine and self-care activities, including taking care of her animals, which provides her with a sense of responsibility and purpose.      Prognosis: Good with Ongoing Treatment  Patient's diagnosis includes adjustment disorder. Unique factors influencing symptom alleviation/remission include: pre-existing conditions, symptom chronicity, symptom severity, degree of impairment, social support, financial security, motivation, patient engagement and medication adherence. Prognosis is largely dependent on patient's adherence to medication treatment plan, follow up appointments and willingness to engage in psychotherapy      Functional Status: Mild impairment     Impression/Formulation: Patient appeared alert and oriented. Patient's major concerns for today's visit include to establish care for increased symptoms of anxiety and depression.      Treatment and medication options discussed during today's visit.  Opportunity provided for any necessary clarification and patient questions. Patient acknowledges and verbally consents to proceed with mutually agreed upon treatment plan. Patient is voluntarily requesting to begin outpatient psychiatric treatment at [facility] [facility]. Patient is receptive to assistance with maintaining a stable lifestyle. Patient presents with history of     ICD-10-CM ICD-9-CM   1. Adjustment disorder with mixed anxiety and depressed mood  F43.23 309.28   .    Reviewed patient's previous provider notes. Reviewed most recent labs. Patient meets DSM V diagnostic criteria for diagnoses. Diagnoses may be updated as more information becomes available.       Differential diagnoses include: BERNARDINO, MDD     Treatment Plan:     Continue Buspar 5mg PO BID and Xanax 0.25mg PO PRN. No refills needed   Initiate Zoloft 25mg PO qd   Encourage to continue  psychotherapy   Follow-up in 6 weeks and as needed    Patient will pursue supportive psychotherapy efforts and medications as prescribed. Provider instructed patient to obtain psychiatric medication from this provider only to prevent polypharmacy and possible overprescribing or unsafe medication combinations. Clinic will obtain release of information for current treatment team for continuity of care as needed. Patient will contact this office, call 911 or present to the nearest emergency room should suicidal or homicidal ideations occur. Discussed medication options and treatment plan of prescribed medication(s) as well as the risks, benefits, and potential side effects. Patient acknowledged and verbally consented to continue with current treatment plan and was educated on the importance of compliance with treatment and follow-up appointments.      MEDICATION Treatment: Discussed medication treatment options and plan of prescribed medication. Potential risks, benefits, and side effects including but not limited to the following reviewed: Black Box warnings, worsening symptoms, SI, sedation, GI side effects, metabolic alterations and blood pressure fluctuations. Patient is reminded to refrain from illicit substance use, including alcohol and THC while taking medications. Also advised to refrain from activity requiring alertness until sedative effects of medication are assessed.      Warned about increased risk of serotonin syndrome associated with use of multiple serotonergic medications.  Patient believes benefits outweigh the risks.  Serotonin syndrome signs and symptoms reviewed such as but not limited to: Diarrhea, shivering, tremors, muscle rigidity, headache and confusion.  Patient agrees to go to ED should any of these occur.       Provider discussed medicinal and nonmedicinal treatment options for treatment of anxiety/depression in patient under age 25, including psychotherapy alone, psychotherapy with SSRI,  or SSRI without psychotherapy.  Provider discussed the evidence supporting use of psychotherapy to develop coping skills without the risk of medication side effects in addition to efficacy of combined treatment using therapy and medication. Lengthy conversation regarding the influence of hormone fluctuations on mood symptoms, impulsivity and increased risk of worsening symptoms and suicidality with use of  SSRI medication in adolescence.  Patient/Guardian educated on Black Box Warning of increased suicidal thoughts and behaviors occurring with use of SSRIs in those under age 25 and advised patient must be monitored closely for subtle signs of worsening depression and/or agitation when SSRI medications are initiated. Appropriate safety precautions should be taken including securing firearms and medications out of patient's reach. Provider should be contacted immediately and patient taken to ED should  SI/HI occur. Provider encouraged patient and/or guardian to weigh risks versus benefits of medication management carefully.    Patient and/or guardian verbalize understanding of risks associated with use of SSRI medication and believe medication is their best treatment option at this time. Guardian and/or patient agrees to monitor for and report worsening symptoms or intolerable side effects and understands patient must return for a two week follow up appointment unless otherwise indicated and agreed upon.      The patient is being prescribed a controlled substance as part of the treatment plan. The patient has been educated of appropriate use of the medication, including risks and side effects such as somnolence, limited ability to drive and/or work or function safely, potential for dependence, respiratory depression, falls, change in blood pressure, changes in heart rhythm or heart rate, activation of other mental illnesses, and overdose among others. Patient is also informed that the medication is to be used by the  patient only, and to avoid any combined use of ETOH, or other substances, with this medication unless prescribed and as directed by a Provider.  The patient verbalized understanding and agreement with this in their own words.       Short-term goals: Patient will adhere to medication regimen and experience continued improvement in symptoms over the next 3 months.   Long-term goals: Patient will adhere to medication treatment plan and report improvement in symptoms over the next 6 months    Quality Measures:     TOBACCO USE:  Tobacco Use: Medium Risk (4/1/2025)    Patient History     Smoking Tobacco Use: Former     Smokeless Tobacco Use: Never     Passive Exposure: Not on file      Former smoker    I advised Saret of the risks of tobacco use.     Follow Up:   No follow-ups on file. 6 weeks and as needed    Patient or patient representative verbalized consent for the use of Ambient Listening during the visit with  WENDY Lopez for chart documentation. 4/1/2025  17:31 EDT    WENDY Lopez, PMHNP-[facility] [address]

## 2025-04-02 DIAGNOSIS — F41.9 ANXIETY: ICD-10-CM

## 2025-04-02 NOTE — TELEPHONE ENCOUNTER
PATIENT HAD TELEHEALTH WITH SHARON AND FORGOT TO LET HER KNOW THAT SHE NEEDS REFILL ON HER  ALPRAZolam (Xanax) 0.25 MG tablet

## 2025-04-03 RX ORDER — ALPRAZOLAM 0.25 MG
0.25 TABLET ORAL 2 TIMES DAILY PRN
Qty: 10 TABLET | Refills: 0 | Status: SHIPPED | OUTPATIENT
Start: 2025-04-03

## 2025-04-21 ENCOUNTER — OFFICE VISIT (OUTPATIENT)
Dept: INTERNAL MEDICINE | Facility: CLINIC | Age: 55
End: 2025-04-21
Payer: COMMERCIAL

## 2025-04-21 VITALS
HEIGHT: 65 IN | DIASTOLIC BLOOD PRESSURE: 80 MMHG | SYSTOLIC BLOOD PRESSURE: 132 MMHG | BODY MASS INDEX: 21.99 KG/M2 | HEART RATE: 78 BPM | WEIGHT: 132 LBS | RESPIRATION RATE: 18 BRPM | TEMPERATURE: 98 F

## 2025-04-21 DIAGNOSIS — Z12.11 COLON CANCER SCREENING: ICD-10-CM

## 2025-04-21 DIAGNOSIS — Z13.220 LIPID SCREENING: ICD-10-CM

## 2025-04-21 DIAGNOSIS — I10 PRIMARY HYPERTENSION: Primary | ICD-10-CM

## 2025-04-21 DIAGNOSIS — Z13.29 THYROID DISORDER SCREEN: ICD-10-CM

## 2025-04-21 DIAGNOSIS — F41.8 SITUATIONAL ANXIETY: ICD-10-CM

## 2025-04-21 DIAGNOSIS — Z13.1 ENCOUNTER FOR SCREENING EXAMINATION FOR IMPAIRED GLUCOSE REGULATION AND DIABETES MELLITUS: ICD-10-CM

## 2025-04-21 PROCEDURE — 99214 OFFICE O/P EST MOD 30 MIN: CPT | Performed by: NURSE PRACTITIONER

## 2025-04-21 PROCEDURE — 1126F AMNT PAIN NOTED NONE PRSNT: CPT | Performed by: NURSE PRACTITIONER

## 2025-04-21 RX ORDER — AMLODIPINE BESYLATE 5 MG/1
5 TABLET ORAL DAILY
Qty: 90 TABLET | Refills: 1 | Status: SHIPPED | OUTPATIENT
Start: 2025-04-21

## 2025-04-21 NOTE — PROGRESS NOTES
"Chief Complaint  Primary hypertension (1 month follow up. )    Subjective          Lucero Carrion presents to [facility]  History of Present Illness  History of Present Illness  Patient here today for follow-up of hypertension last visit blood pressure was elevated started on amlodipine 5 mg.    Next visit 6 months for physical    She is due for her pneumonia vaccine hep B vaccine tetanus shingles and colonoscopy      The patient presents for evaluation of blood pressure management and health maintenance.    Blood pressure has been predominantly within the normal range, as indicated by the green zone on her monitor. She is currently on amlodipine for blood pressure management and has expressed a desire to continue this medication. A 90-day refill of her prescription has been requested.    An upcoming mammogram is scheduled for 06/2025. Colorectal cancer screening through a stool test was previously completed, and she is open to repeating this test. Immunizations have been declined at this time due to her current regimen of new medications. Zoloft has been prescribed by her psychiatrist, which is not part of her usual medication routine. Plans to relocate to [place] have been mentioned, and she has requested that her medical records be transferred to her new healthcare provider upon her move. Acupuncture treatments have been received.    Objective   Vital Signs:   /80 (BP Location: Right arm, Patient Position: Sitting, Cuff Size: Adult)   Pulse 78   Temp 98 °F (36.7 °C) (Infrared)   Resp 18   Ht 165.1 cm (65\")   Wt 59.9 kg (132 lb)   BMI 21.97 kg/m²     Physical Exam  Vitals and nursing note reviewed.   Constitutional:       General: She is not in acute distress.     Appearance: Normal appearance. She is well-developed. She is not ill-appearing.   HENT:      Head: Normocephalic and atraumatic.   Neck:      Thyroid: No thyromegaly.   Cardiovascular:      Rate " and Rhythm: Normal rate and regular rhythm.   Pulmonary:      Effort: Pulmonary effort is normal.      Breath sounds: Normal breath sounds.   Lymphadenopathy:      Cervical: No cervical adenopathy.   Skin:     Capillary Refill: Capillary refill takes 2 to 3 seconds.      Coloration: Skin is not pale.   Neurological:      Mental Status: She is alert and oriented to person, place, and time.   Psychiatric:         Mood and Affect: Mood normal.         Behavior: Behavior normal.        Result Review :                 Assessment and Plan    Diagnoses and all orders for this visit:    1. Primary hypertension (Primary)  -     amLODIPine (NORVASC) 5 MG tablet; Take 1 tablet by mouth Daily.  Dispense: 90 tablet; Refill: 1  -     Comprehensive Metabolic Panel; Future  -     POC Urinalysis Dipstick, Automated; Future    2. Situational anxiety    3. Colon cancer screening  -     Cologuard - Stool, Per Rectum; Future    4. Encounter for screening examination for impaired glucose regulation and diabetes mellitus  -     Comprehensive Metabolic Panel; Future  -     POC Urinalysis Dipstick, Automated; Future    5. Lipid screening  -     Lipid Panel; Future    6. Thyroid disorder screen  -     TSH; Future      Assessment & Plan  1. Blood pressure management.  - Blood pressure is being monitored, with fluctuations noted but generally within the acceptable range.  - Currently taking amlodipine for blood pressure control.  - Advised to monitor for signs of hypertension or hypotension, specifically readings <90/60 or symptoms such as dizziness upon standing.  - Prescription refill for amlodipine for 6 months, with a 90-day supply, will be sent to [facility].    2. Health maintenance.  - Due for a physical examination and several screenings, including colonoscopy, mammogram, and immunizations.  - Cologuard test will be ordered for colorectal cancer screening.  - Screening blood work will be ordered; patient will come in for  fasting blood work.  - Has an appointment for a mammogram in 06/2025. If relocating to [place], medical records will be transferred to the new healthcare provider.    Follow-up  - Follow up in 6 months.        BMI is within normal parameters. No other follow-up for BMI required.    A1C:   Lab Results   Component Value Date    HGBA1C 4.8 12/30/2019      ACP: Advance Care Planning   ACP discussion was held with the patient during this visit. Patient does not have an advance directive, information provided.       Follow Up   Return in about 6 months (around 10/21/2025) for fasting, Annual.  Patient was given instructions and counseling regarding her condition or for health maintenance advice. Please see specific information pulled into the AVS if appropriate.     RTC/call  If symptoms worsen  Meds MOA and SE's reviewed and pt v/u    04/21/25   11:44 EDT    Patient or patient representative verbalized consent to the visit recording.  I have personally performed the services described in this document as transcribed by the above individual, and it is both accurate and complete.

## 2025-04-21 NOTE — PATIENT INSTRUCTIONS
Advance Care Planning and Advance Directives     You make decisions on a daily basis - decisions about where you want to live, your career, your home, your life. Perhaps one of the most important decisions you face is your choice for future medical care. Take time to talk with your family and your healthcare team and start planning today.  Advance Care Planning is a process that can help you:  Understand possible future healthcare decisions in light of your own experiences  Reflect on those decision in light of your goals and values  Discuss your decisions with those closest to you and the healthcare professionals that care for you  Make a plan by creating a document that reflects your wishes    Surrogate Decision Maker  In the event of a medical emergency, which has left you unable to communicate or to make your own decisions, you would need someone to make decisions for you.  It is important to discuss your preferences for medical treatment with this person while you are in good health.     Qualities of a surrogate decision maker:  Willing to take on this role and responsibility  Knows what you want for future medical care  Willing to follow your wishes even if they don't agree with them  Able to make difficult medical decisions under stressful circumstances    Advance Directives  These are legal documents you can create that will guide your healthcare team and decision maker(s) when needed. These documents can be stored in the electronic medical record.    Living Will - a legal document to guide your care if you have a terminal condition or a serious illness and are unable to communicate. States vary by statute in document names/types, but most forms may include one or more of the following:        -  Directions regarding life-prolonging treatments        -  Directions regarding artificially provided nutrition/hydration        -  Choosing a healthcare decision maker        -  Direction regarding organ/tissue  "donation    Durable Power of  for Healthcare - this document names an -in-fact to make medical decisions for you, but it may also allow this person to make personal and financial decisions for you. Please seek the advice of an  if you need this type of document.    **Advance Directives are not required and no one may discriminate against you if you do not sign one.    Medical Orders  Many states allow specific forms/orders signed by your physician to record your wishes for medical treatment in your current state of health. This form, signed in personal communication with your physician, addresses resuscitation and other medical interventions that you may or may not want.      For more information or to schedule a time with a [facility] Advance Care Planning Facilitator contact: [facility] or call [phone] and someone will contact you directly.DASH Eating Plan  DASH stands for Dietary Approaches to Stop Hypertension. The DASH eating plan is a healthy eating plan that has been shown to:  Lower high blood pressure (hypertension).  Reduce your risk for type 2 diabetes, heart disease, and stroke.  Help with weight loss.  What are tips for following this plan?  Reading food labels  Check food labels for the amount of salt (sodium) per serving. Choose foods with less than 5 percent of the Daily Value (DV) of sodium. In general, foods with less than 300 milligrams (mg) of sodium per serving fit into this eating plan.  To find whole grains, look for the word \"whole\" as the first word in the ingredient list.  Shopping  Buy products labeled as \"low-sodium\" or \"no salt added.\"  Buy fresh foods. Avoid canned foods and pre-made or frozen meals.  Cooking  Try not to add salt when you cook. Use salt-free seasonings or herbs instead of table salt or sea salt. Check with your health care provider or pharmacist before using salt substitutes.  Do not miller foods. Cook foods in healthy ways, such " as baking, boiling, grilling, roasting, or broiling.  Cook using oils that are good for your heart. These include olive, canola, avocado, soybean, and sunflower oil.  Meal planning    Eat a balanced diet. This should include:  4 or more servings of fruits and 4 or more servings of vegetables each day. Try to fill half of your plate with fruits and vegetables.  6-8 servings of whole grains each day.  6 or less servings of lean meat, poultry, or fish each day. 1 oz is 1 serving. A 3 oz (85 g) serving of meat is about the same size as the palm of your hand. One egg is 1 oz (28 g).  2-3 servings of low-fat dairy each day. One serving is 1 cup (237 mL).  1 serving of nuts, seeds, or beans 5 times each week.  2-3 servings of heart-healthy fats. Healthy fats called omega-3 fatty acids are found in foods such as walnuts, flaxseeds, fortified milks, and eggs. These fats are also found in cold-water fish, such as sardines, salmon, and mackerel.  Limit how much you eat of:  Canned or prepackaged foods.  Food that is high in trans fat, such as fried foods.  Food that is high in saturated fat, such as fatty meat.  Desserts and other sweets, sugary drinks, and other foods with added sugar.  Full-fat dairy products.  Do not salt foods before eating.  Do not eat more than 4 egg yolks a week.  Try to eat at least 2 vegetarian meals a week.  Eat more home-cooked food and less restaurant, buffet, and fast food.  Lifestyle  When eating at a restaurant, ask if your food can be made with less salt or no salt.  If you drink alcohol:  Limit how much you have to:  0-1 drink a day if you are female.  0-2 drinks a day if you are male.  Know how much alcohol is in your drink. In the [place] one drink is one 12 oz bottle of beer (355 mL), one 5 oz glass of wine (148 mL), or one 1½ oz glass of hard liquor (44 mL).  General information  Avoid eating more than 2,300 mg of salt a day. If you have hypertension, you may need to reduce your sodium  intake to 1,500 mg a day.  Work with your provider to stay at a healthy body weight or lose weight. Ask what the best weight range is for you.  On most days of the week, get at least 30 minutes of exercise that causes your heart to beat faster. This may include walking, swimming, or biking.  Work with your provider or dietitian to adjust your eating plan to meet your specific calorie needs.  What foods should I eat?  Fruits  All fresh, dried, or frozen fruit. Canned fruits that are in their natural juice and do not have sugar added to them.  Vegetables  Fresh or frozen vegetables that are raw, steamed, roasted, or grilled. Low-sodium or reduced-sodium tomato and vegetable juice. Low-sodium or reduced-sodium tomato sauce and tomato paste. Low-sodium or reduced-sodium canned vegetables.  Grains  Whole-grain or whole-wheat bread. Whole-grain or whole-wheat pasta. Brown rice. Oatmeal. Quinoa. Bulgur. Whole-grain and low-sodium cereals. Nicci bread. Low-fat, low-sodium crackers. Whole-wheat flour tortillas.  Meats and other proteins  Skinless chicken or turkey. Ground chicken or turkey. Pork with fat trimmed off. Fish and seafood. Egg whites. Dried beans, peas, or lentils. Unsalted nuts, nut butters, and seeds. Unsalted canned beans. Lean cuts of beef with fat trimmed off. Low-sodium, lean precooked or cured meat, such as sausages or meat loaves.  Dairy  Low-fat (1%) or fat-free (skim) milk. Reduced-fat, low-fat, or fat-free cheeses. Nonfat, low-sodium ricotta or cottage cheese. Low-fat or nonfat yogurt. Low-fat, low-sodium cheese.  Fats and oils  Soft margarine without trans fats. Vegetable oil. Reduced-fat, low-fat, or light mayonnaise and salad dressings (reduced-sodium). Canola, safflower, olive, avocado, soybean, and sunflower oils. Avocado.  Seasonings and condiments  Herbs. Spices. Seasoning mixes without salt.  Other foods  Unsalted popcorn and pretzels. Fat-free sweets.  The items listed above may not be all the  foods and drinks you can have. Talk to a dietitian to learn more.  What foods should I avoid?  Fruits  Canned fruit in a light or heavy syrup. Fried fruit. Fruit in cream or butter sauce.  Vegetables  Creamed or fried vegetables. Vegetables in a cheese sauce. Regular canned vegetables that are not marked as low-sodium or reduced-sodium. Regular canned tomato sauce and paste that are not marked as low-sodium or reduced-sodium. Regular tomato and vegetable juices that are not marked as low-sodium or reduced-sodium. Pickles. Olives.  Grains  Baked goods made with fat, such as croissants, muffins, or some breads. Dry pasta or rice meal packs.  Meats and other proteins  Fatty cuts of meat. Ribs. Fried meat. Pereira. Bologna, salami, and other precooked or cured meats, such as sausages or meat loaves, that are not lean and low in sodium. Fat from the back of a pig (fatback). Bratwurst. Salted nuts and seeds. Canned beans with added salt. Canned or smoked fish. Whole eggs or egg yolks. Chicken or turkey with skin.  Dairy  Whole or 2% milk, cream, and half-and-half. Whole or full-fat cream cheese. Whole-fat or sweetened yogurt. Full-fat cheese. Nondairy creamers. Whipped toppings. Processed cheese and cheese spreads.  Fats and oils  Butter. Stick margarine. Lard. Shortening. Ghee. Pereira fat. Tropical oils, such as coconut, palm kernel, or palm oil.  Seasonings and condiments  Onion salt, garlic salt, seasoned salt, table salt, and sea salt. [facility] sauce. Tartar sauce. Barbecue sauce. Teriyaki sauce. Soy sauce, including reduced-sodium soy sauce. Steak sauce. Canned and packaged gravies. Fish sauce. Oyster sauce. Cocktail sauce. Store-bought horseradish. Ketchup. Mustard. Meat flavorings and tenderizers. Bouillon cubes. Hot sauces. Pre-made or packaged marinades. Pre-made or packaged taco seasonings. Relishes. Regular salad dressings.  Other foods  Salted popcorn and pretzels.  The items listed above may not be all the  foods and drinks you should avoid. Talk to a dietitian to learn more.  Where to find more information  National Heart, Lung, and Blood [place] (NHLBI): nhlbi.nih.gov  American Heart Association (AHA): heart.org  Academy of Nutrition and Dietetics: eatright.org  National Kidney Foundation (NKF): kidney.org  This information is not intended to replace advice given to you by your health care provider. Make sure you discuss any questions you have with your health care provider.  Document Revised: 01/04/2024 Document Reviewed: 01/04/2024  [employer] Patient Education © 2024 [employer]  Advance Care Planning and Advance Directives     You make decisions on a daily basis - decisions about where you want to live, your career, your home, your life. Perhaps one of the most important decisions you face is your choice for future medical care. Take time to talk with your family and your healthcare team and start planning today.  Advance Care Planning is a process that can help you:  Understand possible future healthcare decisions in light of your own experiences  Reflect on those decision in light of your goals and values  Discuss your decisions with those closest to you and the healthcare professionals that care for you  Make a plan by creating a document that reflects your wishes    Surrogate Decision Maker  In the event of a medical emergency, which has left you unable to communicate or to make your own decisions, you would need someone to make decisions for you.  It is important to discuss your preferences for medical treatment with this person while you are in good health.     Qualities of a surrogate decision maker:  Willing to take on this role and responsibility  Knows what you want for future medical care  Willing to follow your wishes even if they don't agree with them  Able to make difficult medical decisions under stressful circumstances    Advance Directives  These are legal documents you can create that will  guide your healthcare team and decision maker(s) when needed. These documents can be stored in the electronic medical record.    Living Will - a legal document to guide your care if you have a terminal condition or a serious illness and are unable to communicate. States vary by statute in document names/types, but most forms may include one or more of the following:        -  Directions regarding life-prolonging treatments        -  Directions regarding artificially provided nutrition/hydration        -  Choosing a healthcare decision maker        -  Direction regarding organ/tissue donation    Durable Power of  for Healthcare - this document names an -in-fact to make medical decisions for you, but it may also allow this person to make personal and financial decisions for you. Please seek the advice of an  if you need this type of document.    **Advance Directives are not required and no one may discriminate against you if you do not sign one.    Medical Orders  Many states allow specific forms/orders signed by your physician to record your wishes for medical treatment in your current state of health. This form, signed in personal communication with your physician, addresses resuscitation and other medical interventions that you may or may not want.      For more information or to schedule a time with a [facility] Advance Care Planning Facilitator contact: [facility].[email or web address]/ACP or call [phone] and someone will contact you directly.ACP information pamphlet given to the patient.  ACP information provided on the AVS.

## 2025-06-20 ENCOUNTER — TELEPHONE (OUTPATIENT)
Dept: INTERNAL MEDICINE | Facility: CLINIC | Age: 55
End: 2025-06-20
Payer: COMMERCIAL

## 2025-06-20 NOTE — TELEPHONE ENCOUNTER
Overdue lab work. LVM for patient to come in and have lab work completed.       HUB TO RELAY: patient needs to come in to complete lab work.

## 2025-07-10 ENCOUNTER — TELEPHONE (OUTPATIENT)
Dept: INTERNAL MEDICINE | Facility: CLINIC | Age: 55
End: 2025-07-10
Payer: COMMERCIAL
